# Patient Record
Sex: FEMALE | Race: BLACK OR AFRICAN AMERICAN | NOT HISPANIC OR LATINO
[De-identification: names, ages, dates, MRNs, and addresses within clinical notes are randomized per-mention and may not be internally consistent; named-entity substitution may affect disease eponyms.]

---

## 2017-01-26 PROBLEM — Z00.00 ENCOUNTER FOR PREVENTIVE HEALTH EXAMINATION: Status: ACTIVE | Noted: 2017-01-26

## 2017-03-06 ENCOUNTER — APPOINTMENT (OUTPATIENT)
Dept: NEUROLOGY | Facility: CLINIC | Age: 67
End: 2017-03-06

## 2017-05-23 ENCOUNTER — APPOINTMENT (OUTPATIENT)
Dept: NEUROLOGY | Facility: CLINIC | Age: 67
End: 2017-05-23

## 2017-05-23 VITALS
DIASTOLIC BLOOD PRESSURE: 78 MMHG | SYSTOLIC BLOOD PRESSURE: 130 MMHG | HEIGHT: 60 IN | WEIGHT: 100 LBS | BODY MASS INDEX: 19.63 KG/M2

## 2017-05-23 DIAGNOSIS — G47.00 INSOMNIA, UNSPECIFIED: ICD-10-CM

## 2017-05-23 DIAGNOSIS — Z78.9 OTHER SPECIFIED HEALTH STATUS: ICD-10-CM

## 2017-05-23 RX ORDER — MULTIVITAMIN
TABLET ORAL DAILY
Refills: 0 | Status: ACTIVE | COMMUNITY
Start: 2017-05-23

## 2017-08-23 ENCOUNTER — RX RENEWAL (OUTPATIENT)
Age: 67
End: 2017-08-23

## 2017-09-03 ENCOUNTER — RX RENEWAL (OUTPATIENT)
Age: 67
End: 2017-09-03

## 2017-09-12 ENCOUNTER — APPOINTMENT (OUTPATIENT)
Dept: NEUROLOGY | Facility: CLINIC | Age: 67
End: 2017-09-12
Payer: MEDICAID

## 2017-09-12 VITALS
WEIGHT: 100 LBS | SYSTOLIC BLOOD PRESSURE: 115 MMHG | BODY MASS INDEX: 19.63 KG/M2 | HEART RATE: 56 BPM | HEIGHT: 60 IN | DIASTOLIC BLOOD PRESSURE: 74 MMHG

## 2017-09-12 PROCEDURE — 99214 OFFICE O/P EST MOD 30 MIN: CPT

## 2017-09-12 RX ORDER — DONEPEZIL HYDROCHLORIDE 10 MG/1
10 TABLET ORAL DAILY
Qty: 30 | Refills: 2 | Status: DISCONTINUED | COMMUNITY
Start: 2017-05-23 | End: 2017-09-12

## 2018-02-27 ENCOUNTER — APPOINTMENT (OUTPATIENT)
Dept: NEUROLOGY | Facility: CLINIC | Age: 68
End: 2018-02-27

## 2018-10-16 ENCOUNTER — APPOINTMENT (OUTPATIENT)
Dept: NEUROLOGY | Facility: CLINIC | Age: 68
End: 2018-10-16

## 2021-11-30 ENCOUNTER — INPATIENT (INPATIENT)
Facility: HOSPITAL | Age: 71
LOS: 7 days | Discharge: HOME HEALTH | End: 2021-12-08
Attending: STUDENT IN AN ORGANIZED HEALTH CARE EDUCATION/TRAINING PROGRAM | Admitting: STUDENT IN AN ORGANIZED HEALTH CARE EDUCATION/TRAINING PROGRAM
Payer: MEDICAID

## 2021-11-30 VITALS
OXYGEN SATURATION: 98 % | DIASTOLIC BLOOD PRESSURE: 65 MMHG | SYSTOLIC BLOOD PRESSURE: 124 MMHG | HEART RATE: 85 BPM | TEMPERATURE: 98 F | RESPIRATION RATE: 18 BRPM

## 2021-11-30 DIAGNOSIS — R65.10 SYSTEMIC INFLAMMATORY RESPONSE SYNDROME (SIRS) OF NON-INFECTIOUS ORIGIN WITHOUT ACUTE ORGAN DYSFUNCTION: ICD-10-CM

## 2021-11-30 DIAGNOSIS — G93.41 METABOLIC ENCEPHALOPATHY: ICD-10-CM

## 2021-11-30 DIAGNOSIS — Z29.9 ENCOUNTER FOR PROPHYLACTIC MEASURES, UNSPECIFIED: ICD-10-CM

## 2021-11-30 DIAGNOSIS — Z02.9 ENCOUNTER FOR ADMINISTRATIVE EXAMINATIONS, UNSPECIFIED: ICD-10-CM

## 2021-11-30 DIAGNOSIS — F03.90 UNSPECIFIED DEMENTIA WITHOUT BEHAVIORAL DISTURBANCE: ICD-10-CM

## 2021-11-30 DIAGNOSIS — R41.82 ALTERED MENTAL STATUS, UNSPECIFIED: ICD-10-CM

## 2021-11-30 LAB
ALBUMIN SERPL ELPH-MCNC: 3.8 G/DL — SIGNIFICANT CHANGE UP (ref 3.3–5)
ALP SERPL-CCNC: 53 U/L — SIGNIFICANT CHANGE UP (ref 40–120)
ALT FLD-CCNC: 46 U/L — HIGH (ref 4–33)
ANION GAP SERPL CALC-SCNC: 12 MMOL/L — SIGNIFICANT CHANGE UP (ref 7–14)
APPEARANCE UR: CLEAR — SIGNIFICANT CHANGE UP
AST SERPL-CCNC: 32 U/L — SIGNIFICANT CHANGE UP (ref 4–32)
BACTERIA # UR AUTO: ABNORMAL
BASOPHILS # BLD AUTO: 0.01 K/UL — SIGNIFICANT CHANGE UP (ref 0–0.2)
BASOPHILS NFR BLD AUTO: 0.2 % — SIGNIFICANT CHANGE UP (ref 0–2)
BILIRUB SERPL-MCNC: 1.2 MG/DL — SIGNIFICANT CHANGE UP (ref 0.2–1.2)
BILIRUB UR-MCNC: ABNORMAL
BUN SERPL-MCNC: 15 MG/DL — SIGNIFICANT CHANGE UP (ref 7–23)
CALCIUM SERPL-MCNC: 9.2 MG/DL — SIGNIFICANT CHANGE UP (ref 8.4–10.5)
CHLORIDE SERPL-SCNC: 103 MMOL/L — SIGNIFICANT CHANGE UP (ref 98–107)
CO2 SERPL-SCNC: 25 MMOL/L — SIGNIFICANT CHANGE UP (ref 22–31)
COLOR SPEC: ABNORMAL
CREAT SERPL-MCNC: 0.98 MG/DL — SIGNIFICANT CHANGE UP (ref 0.5–1.3)
DIFF PNL FLD: NEGATIVE — SIGNIFICANT CHANGE UP
EOSINOPHIL # BLD AUTO: 0.02 K/UL — SIGNIFICANT CHANGE UP (ref 0–0.5)
EOSINOPHIL NFR BLD AUTO: 0.4 % — SIGNIFICANT CHANGE UP (ref 0–6)
EPI CELLS # UR: 1 /HPF — SIGNIFICANT CHANGE UP (ref 0–5)
GLUCOSE SERPL-MCNC: 109 MG/DL — HIGH (ref 70–99)
GLUCOSE UR QL: NEGATIVE — SIGNIFICANT CHANGE UP
HCT VFR BLD CALC: 36.9 % — SIGNIFICANT CHANGE UP (ref 34.5–45)
HGB BLD-MCNC: 12.3 G/DL — SIGNIFICANT CHANGE UP (ref 11.5–15.5)
HYALINE CASTS # UR AUTO: 3 /LPF — SIGNIFICANT CHANGE UP (ref 0–7)
IANC: 3.01 K/UL — SIGNIFICANT CHANGE UP (ref 1.5–8.5)
IMM GRANULOCYTES NFR BLD AUTO: 0.8 % — SIGNIFICANT CHANGE UP (ref 0–1.5)
KETONES UR-MCNC: NEGATIVE — SIGNIFICANT CHANGE UP
LACTATE BLDV-MCNC: 1.4 MMOL/L — SIGNIFICANT CHANGE UP (ref 0.5–2)
LEUKOCYTE ESTERASE UR-ACNC: NEGATIVE — SIGNIFICANT CHANGE UP
LYMPHOCYTES # BLD AUTO: 1.12 K/UL — SIGNIFICANT CHANGE UP (ref 1–3.3)
LYMPHOCYTES # BLD AUTO: 23.8 % — SIGNIFICANT CHANGE UP (ref 13–44)
MAGNESIUM SERPL-MCNC: 2.3 MG/DL — SIGNIFICANT CHANGE UP (ref 1.6–2.6)
MCHC RBC-ENTMCNC: 30.8 PG — SIGNIFICANT CHANGE UP (ref 27–34)
MCHC RBC-ENTMCNC: 33.3 GM/DL — SIGNIFICANT CHANGE UP (ref 32–36)
MCV RBC AUTO: 92.5 FL — SIGNIFICANT CHANGE UP (ref 80–100)
MONOCYTES # BLD AUTO: 0.51 K/UL — SIGNIFICANT CHANGE UP (ref 0–0.9)
MONOCYTES NFR BLD AUTO: 10.8 % — SIGNIFICANT CHANGE UP (ref 2–14)
NEUTROPHILS # BLD AUTO: 3.01 K/UL — SIGNIFICANT CHANGE UP (ref 1.8–7.4)
NEUTROPHILS NFR BLD AUTO: 64 % — SIGNIFICANT CHANGE UP (ref 43–77)
NITRITE UR-MCNC: NEGATIVE — SIGNIFICANT CHANGE UP
NRBC # BLD: 0 /100 WBCS — SIGNIFICANT CHANGE UP
NRBC # FLD: 0 K/UL — SIGNIFICANT CHANGE UP
PH UR: 6 — SIGNIFICANT CHANGE UP (ref 5–8)
PLATELET # BLD AUTO: 305 K/UL — SIGNIFICANT CHANGE UP (ref 150–400)
POTASSIUM SERPL-MCNC: 3.7 MMOL/L — SIGNIFICANT CHANGE UP (ref 3.5–5.3)
POTASSIUM SERPL-SCNC: 3.7 MMOL/L — SIGNIFICANT CHANGE UP (ref 3.5–5.3)
PROT SERPL-MCNC: 7.7 G/DL — SIGNIFICANT CHANGE UP (ref 6–8.3)
PROT UR-MCNC: ABNORMAL
RBC # BLD: 3.99 M/UL — SIGNIFICANT CHANGE UP (ref 3.8–5.2)
RBC # FLD: 12.6 % — SIGNIFICANT CHANGE UP (ref 10.3–14.5)
RBC CASTS # UR COMP ASSIST: 2 /HPF — SIGNIFICANT CHANGE UP (ref 0–4)
SARS-COV-2 RNA SPEC QL NAA+PROBE: DETECTED
SODIUM SERPL-SCNC: 140 MMOL/L — SIGNIFICANT CHANGE UP (ref 135–145)
SP GR SPEC: 1.03 — SIGNIFICANT CHANGE UP (ref 1–1.05)
UROBILINOGEN FLD QL: ABNORMAL
WBC # BLD: 4.71 K/UL — SIGNIFICANT CHANGE UP (ref 3.8–10.5)
WBC # FLD AUTO: 4.71 K/UL — SIGNIFICANT CHANGE UP (ref 3.8–10.5)
WBC UR QL: 2 /HPF — SIGNIFICANT CHANGE UP (ref 0–5)

## 2021-11-30 PROCEDURE — 99223 1ST HOSP IP/OBS HIGH 75: CPT

## 2021-11-30 PROCEDURE — 99285 EMERGENCY DEPT VISIT HI MDM: CPT

## 2021-11-30 PROCEDURE — 71045 X-RAY EXAM CHEST 1 VIEW: CPT | Mod: 26

## 2021-11-30 RX ORDER — ACETAMINOPHEN 500 MG
650 TABLET ORAL ONCE
Refills: 0 | Status: COMPLETED | OUTPATIENT
Start: 2021-11-30 | End: 2021-11-30

## 2021-11-30 RX ORDER — AZITHROMYCIN 500 MG/1
500 TABLET, FILM COATED ORAL ONCE
Refills: 0 | Status: COMPLETED | OUTPATIENT
Start: 2021-11-30 | End: 2021-11-30

## 2021-11-30 RX ORDER — CEFTRIAXONE 500 MG/1
1000 INJECTION, POWDER, FOR SOLUTION INTRAMUSCULAR; INTRAVENOUS ONCE
Refills: 0 | Status: COMPLETED | OUTPATIENT
Start: 2021-11-30 | End: 2021-11-30

## 2021-11-30 RX ORDER — SODIUM CHLORIDE 9 MG/ML
1000 INJECTION INTRAMUSCULAR; INTRAVENOUS; SUBCUTANEOUS ONCE
Refills: 0 | Status: COMPLETED | OUTPATIENT
Start: 2021-11-30 | End: 2021-11-30

## 2021-11-30 RX ADMIN — SODIUM CHLORIDE 1000 MILLILITER(S): 9 INJECTION INTRAMUSCULAR; INTRAVENOUS; SUBCUTANEOUS at 20:01

## 2021-11-30 RX ADMIN — Medication 650 MILLIGRAM(S): at 20:02

## 2021-11-30 RX ADMIN — Medication 650 MILLIGRAM(S): at 22:00

## 2021-11-30 RX ADMIN — CEFTRIAXONE 100 MILLIGRAM(S): 500 INJECTION, POWDER, FOR SOLUTION INTRAMUSCULAR; INTRAVENOUS at 21:04

## 2021-11-30 RX ADMIN — Medication 650 MILLIGRAM(S): at 21:04

## 2021-11-30 RX ADMIN — AZITHROMYCIN 255 MILLIGRAM(S): 500 TABLET, FILM COATED ORAL at 23:10

## 2021-11-30 NOTE — ED PROVIDER NOTE - ATTENDING CONTRIBUTION TO CARE
I have personally seen and examined this patient.  I have fully participated in the care of this patient. I have reviewed all pertinent clinical information, including history, physical exam, plan and the ACP’s note and agree except as noted. - MD Dionicio.    70 yo F, with dementia, since 58 yo, after lost child, pt lives with her daughter, more agitation, forgetfullness over the last 2wk, now stopped PO x5d, febrile rectally, likely from infection, ua, xr, given ams, likely admission for iv abx.

## 2021-11-30 NOTE — H&P ADULT - ATTENDING COMMENTS
This is a 70 y/o F with pmhx of dementia presented to the ED for new onset weakness. The patient and family member denies shortness of breath and coughing. Daughter states she is completely healthy. The patient herself denies symptoms. However, the daughter complains that lately she has been more weak and not herself, more confused and speaking less. The patient does have baseline dementia, but this is not her baseline. Both daughter and patient are not vaccinated for covid because the daughter does not believe in it.    Physical exam shows an elderly female, NAD, on room air with good SpO2, in fetal position however would allow examination, lungs CTA b/l however poor inspiratory effort, cardiac s1s2 RRR no murmurs, no LE edema b/l.    Labs significant for elevated ferritin level, elevated LDH, elevated CRP, and COVID pos on RVP. CXR significant for R sided opacities.    The patient is admitted for covid pneumonia with possibly super imposed bacterial pneumonia. There is concern for bacterial pneumonia because on the CXR it only shows 1 sided opacities which are atypical of covid pneumonia presentation. Will start empiric abx and obtain blood cultures, sputum cultures. Will obtain CT chest to further characterize opacities. Will monitor respiratory status and provide supplemental oxygen as needed. Will hold off giving remdesivir and dexamethasone for now since she is currently on room air however  will start if she becomes oxygen dependent. I thoroughly explained to the daughter that it is important for her and the patient to get the vaccine especially due to the recent, new omicron variant. I also explained to her that there is a chance that she may develop acute respiratory failure from covid19 and may need intubation if she does get worse throughout her admission. Daughter expressed understanding (Lashaun, [phone]).

## 2021-11-30 NOTE — H&P ADULT - PROBLEM SELECTOR PLAN 8
1. Name of PCP: Michela East [phone]  2. PCP Contacted on Admission [ ] Y [x] N [ ] N/A  3. PCP Contacted on Discharge [ ] Y [ ] N [ ] N/A  4. Post discharge appointment Date and Location:  5. Summary of Handoff Given to PCP [ ] Y [ ] N

## 2021-11-30 NOTE — ED PROVIDER NOTE - WR ORDER DATE AND TIME 1
"Responded to unit referral. Pt . Family by bedside. Provided pastoral care and grief packet to family. Family very tearful as pt's "mom just  two days ago". Family experiencing multiple losses.  Family appreciative of visit. Provided decedent packet to unit staff.   " 30-Nov-2021 19:24

## 2021-11-30 NOTE — H&P ADULT - NSHPREVIEWOFSYSTEMS_GEN_ALL_CORE
REVIEW OF SYSTEMS:  CONSTITUTIONAL: No fever or chills  EYES: No visual disturbances or eye pain  ENMT: No sinus or throat pain  RESPIRATORY: No shortness of breath or cough  CARDIOVASCULAR: No chest pain, palpitations, or dizziness  GASTROINTESTINAL: No abdominal or epigastric pain. No diarrhea. No melena or hematochezia. +constipated x 2 wk, unclear flatus  GENITOURINARY: No dysuria or hematuria +decreased UOP  NEUROLOGICAL: No headaches, loss of strength, numbness, or tremors  MUSCULOSKELETAL: No joint pain or swelling; No muscle, back, or extremity pain

## 2021-11-30 NOTE — ED ADULT TRIAGE NOTE - CHIEF COMPLAINT QUOTE
p/t with hx dementia brought in by daughter for increased AMS, not eating for few days, baseline confused, fs 79mdl, cup of apple juice given

## 2021-11-30 NOTE — ED PROVIDER NOTE - PHYSICAL EXAMINATION
Vital signs reviewed.   CONSTITUTIONAL: Well-appearing; well-nourished; in no apparent distress. Non-toxic appearing.   HEAD: Normocephalic, atraumatic.  EYES: Normal conjunctiva and no sclera injection noted  ENT: normal nose; no rhinorrhea  CARD: Normal S1, S2  RESP: Normal chest excursion with respiration; breath sounds clear and equal bilaterally  ABD/GI: soft, non-distended; non-tender; no CVA tenderness  EXT/MS: moves all extremities; distal pulses are normal, no pedal edema.  SKIN: Normal for age and race; warm; dry; good turgor; no apparent lesions or exudate noted.  NEURO: Awake, alert, oriented to self, no gross deficits, CN II-XII grossly intact, no motor or sensory deficit noted.  PSYCH: Normal mood; appropriate affect.

## 2021-11-30 NOTE — H&P ADULT - HISTORY OF PRESENT ILLNESS
71F PMH dementia (ambulatory, largely independent at bl) p/w AMS. Per daughter (Lashaun) pt is "not herself", less conversational, requiring relatively more attention, ambulating slowly, more hunched, requiring assistance to bath and feed. W/ anorexia, decreased UOP, no recent BM. Not vaccinated.     ED Course  VS: febrile Tmax 38.4C, tachycardic HR 80s-100s, hypertensive BP 110s-140s/60s-70s, tachypneic RR 18-24, SpO2%  RA  Labs: WBC wnl 4.7; ALT elevated 46; lactate wnl 1.4; pending trops  Micro: UA mod bacteria, negative nitrite, LE, 2 WBCs; BCx/UCx/RVP w/ COVID-19 pending  Imaging: CXR patchy R lung opacities likely infxs  Received: CTX 1g IV, azithro 500mg IV; NS 1L IV; tyl 650mg rectal and po    Admit to medicine for further eval/mgt 71F PMH dementia (ambulatory, largely independent at bl) p/w AMS. Per daughter (Lashaun) pt is "not herself", less conversational, requiring relatively more attention, ambulating slowly, more hunched, requiring assistance to bath and feed. W/ anorexia, decreased UOP, no recent BM. Not vaccinated.     ED Course  VS: febrile Tmax 38.4C, tachycardic HR 80s-100s, hypertensive BP 110s-140s/60s-70s, tachypneic RR 18-24, SpO2%  RA  Labs: WBC wnl 4.7; ALT elevated 46; lactate wnl 1.4; pending trops  Micro: +RVP SARS COV2; UA mod bacteria, negative nitrite, LE, 2 WBCs; BCx/UCx pending  Imaging: CXR patchy R lung opacities likely infxs  Received: CTX 1g IV, azithro 500mg IV; NS 1L IV; tyl 650mg rectal and po    Admit to medicine for further eval/mgt 71F PMH dementia (ambulatory at bl, largely independent), BIB daughter iso AMS. Per daughter (Lashaun) pt is "not herself", transitioned from acting like a 12yr-old at bl to now acting like a 4yr-old, including being less conversational,  requiring relatively more attention, ambulating slowly, more hunched, requiring assistance to bath and feed. Additionally reports anorexia, decreased UOP, w/o recent BM x 2 wks, unclear if passing flatus, however denies abd pain. Reports b/l UE pain when lifting arms x wks, uses OTC Tylenol for relief, and otherwise denies sx.     Of not pt and daughter are both not vaccinated.    ED Course  VS: febrile Tmax 38.4C, tachycardic HR 80s-100s, hypertensive BP 110s-140s/60s-70s, tachypneic RR 18-24, SpO2%  RA  Labs: WBC wnl 4.7; ALT elevated 46; lactate wnl 1.4; pending trops  Micro: +RVP SARS COV2; UA mod bacteria, negative nitrite, LE, 2 WBCs; BCx/UCx pending  Imaging: CXR patchy R lung opacities likely infxs  Received: CTX 1g IV, azithro 500mg IV; NS 1L IV; tyl 650mg rectal and po    Admit to medicine for further eval/mgt

## 2021-11-30 NOTE — H&P ADULT - PROBLEM SELECTOR PLAN 2
superimposed on bl dementia, decreased conversation, requiring further assistance w/ ambulation, bathing, feeding  likely iso acute infxn  - CTH NC r/o primary neuro etiology  - q4h neurocheck  - monitor for improvement on abx RVP+ SARSCOV2 iso non-vaccinated status  meeting SIRS as above, however SpO2% adequate on RA  - consider remdesivir, dexamethasone if beginning to require supplemental O2  - bl CBC w/ diff, CMP, trop obtained; will obtain LDH, procal, CPK, CRP, ferritin, D-Dimer, coags, A1c  - rpt D-Dimer 48-72h  - isolation precautions  - VTE ppx RVP+ SARSCOV2 iso non-vaccinated status  meeting SIRS as above, however SpO2% adequate on RA  - consider remdesivir, dexamethasone if beginning to require supplemental O2  - bl CBC w/ diff, CMP, trop obtained; will obtain LDH, procal, CPK, CRP, ferritin, D-Dimer, coags, A1c  - rpt D-Dimer 48-72h  - isolation precautions  - VTE ppx  - CT Chest NC as above RVP+ SARSCOV2 iso non-vaccinated status  meeting SIRS as above, however SpO2% adequate on RA  - consider remdesivir, dexamethasone if beginning to require supplemental O2  - bl CBC w/ diff, CMP, trop obtained; will obtain LDH, procal, CPK, CRP, ferritin, D-Dimer, coags, A1c  - rpt D-Dimer 48-72h  - isolation precautions  - VTE holding pending CTH r/o bleed, if w/o concerning findings can start lovenox 40mg QD pending D-Dimer (can adjust lovenox dose once obtained)  - CT Chest NC as above  - monitor SpO2%, supplemental O2 prn

## 2021-11-30 NOTE — ED ADULT NURSE NOTE - NSIMPLEMENTINTERV_GEN_ALL_ED
Implemented All Fall Risk Interventions:  [place] to call system. Call bell, personal items and telephone within reach. Instruct patient to call for assistance. Room bathroom lighting operational. Non-slip footwear when patient is off stretcher. Physically safe environment: no spills, clutter or unnecessary equipment. Stretcher in lowest position, wheels locked, appropriate side rails in place. Provide visual cue, wrist band, yellow gown, etc. Monitor gait and stability. Monitor for mental status changes and reorient to person, place, and time. Review medications for side effects contributing to fall risk. Reinforce activity limits and safety measures with patient and family.

## 2021-11-30 NOTE — ED ADULT NURSE NOTE - OBJECTIVE STATEMENT
70 y/o female, a&ox2, received to rm 16 for altered mental status. Family at bedside reports "pt has not eaten for the past few days and has not been acting like herself." Family reports pmh of dementia. Pt denies CP, SOB, and pain at this time. Pt presents agitated. Respirations are even and unlabored, no signs of respiratory distress. Sinus tachycardia on cardiac monitor.

## 2021-11-30 NOTE — ED PROVIDER NOTE - OBJECTIVE STATEMENT
72 Y/O F w/ PMH of dementia presents with daughter Lashaun for AMS. As per daughter, pt has not been herself the past week. Normally can ambulate without assistance, can perform most ADL's by herself or with assistance but has required more attention and care. Today was noted to be walking slowly, hunched over and needed assistance. Would not scrub herself during shower and needed to be fed by daughter. Has decreased appetite, decreased urinary output and no recent bowel movement. Pt has become less conversational. No hx of CAD, MI not on anticoagulant. 72 Y/O F w/ PMH of dementia presents with daughter Lashaun for AMS. As per daughter, pt has not been herself the past week. Normally can ambulate without assistance, can perform most ADL's by herself or with assistance but has required more attention and care. Today was noted to be walking slowly, hunched over and needed assistance. Would not scrub herself during shower and needed to be fed by daughter. Has decreased appetite, decreased urinary output and no recent bowel movement. Pt has become less conversational. No hx of CAD, MI not on anticoagulant. Not vaccinated

## 2021-11-30 NOTE — H&P ADULT - NSHPPHYSICALEXAM_GEN_ALL_CORE
PHYSICAL EXAM:  GENERAL: Elderly appearing woman, laying in bed in fetal position, constantly moving around, appearing agitated but somewhat redirectable  HEAD: Atraumatic, Normocephalic  EYES: Pupils equal, round; conjunctiva pink and sclera anicteric  ENMT: Moist mucous membranes  NECK: Supple  NERVOUS SYSTEM: A&Ox0-1 (responding to nickname Ofelia), Moving extremities  CHEST/LUNG: Clear to auscultation bilaterally; No rales, rhonchi, wheezing, or rubs +poor inspiratory effort, limited by participation  HEART: Tachycardic w/ regular rhythm; No murmurs, rubs, or gallops  ABDOMEN: Soft, Nontender, Nondistended; Bowel sounds present  EXTREMITIES: 2+ Peripheral Pulses, No edema

## 2021-11-30 NOTE — H&P ADULT - PROBLEM SELECTOR PLAN 5
1. Name of PCP:  2. PCP Contacted on Admission [ ] Y [x] N [ ] N/A  3. PCP Contacted on Discharge [ ] Y [ ] N [ ] N/A  4. Post discharge appointment Date and Location:  5. Summary of Handoff Given to PCP [ ] Y [ ] N 1. Name of PCP: Michela East [phone]  2. PCP Contacted on Admission [ ] Y [x] N [ ] N/A  3. PCP Contacted on Discharge [ ] Y [ ] N [ ] N/A  4. Post discharge appointment Date and Location:  5. Summary of Handoff Given to PCP [ ] Y [ ] N - consider chemical DVT ppx after CTH NC r/o bleed  - regular diet pending formal swallow eval  - fall precaution  - aspiration precaution Per daughter iso decreased po intake  SCr 0.98 unclear bl  - monitor Is/Os  - BS q8h Per daughter iso decreased po intake  SCr 0.98 unclear bl  - UA w/ mod bacteria, f/u UCx  - monitor Is/Os  - BS q8h Per daughter iso decreased po intake  SCr 0.98 unclear bl  - UA w/ mod bacteria, f/u UCx and on abx as above  - monitor Is/Os  - BS q8h

## 2021-11-30 NOTE — H&P ADULT - NSHPLABSRESULTS_GEN_ALL_CORE
LABS:                        12.3   4.71  )-----------( 305      ( 2021 20:17 )             36.9         140  |  103  |  15  ----------------------------<  109<H>  3.7   |  25  |  0.98    Ca    9.2      2021 20:17  Mg     2.30         TPro  7.7  /  Alb  3.8  /  TBili  1.2  /  DBili  x   /  AST  32  /  ALT  46<H>  /  AlkPhos  53        Urinalysis Basic - ( 2021 21:33 )    Color: Blanca / Appearance: Clear / S.028 / pH: x  Gluc: x / Ketone: Negative  / Bili: Small / Urobili: 12 mg/dL   Blood: x / Protein: 30 mg/dL / Nitrite: Negative   Leuk Esterase: Negative / RBC: 2 /HPF / WBC 2 /HPF   Sq Epi: x / Non Sq Epi: 1 /HPF / Bacteria: Moderate          RADIOLOGY & ADDITIONAL TESTS:    < from: Xray Chest 1 View AP/PA (21 @ 22:28) >      ******PRELIMINARY REPORT******    ******PRELIMINARY REPORT******            EXAM:  XR CHEST AP OR PA 1V        PROCEDURE DATE:  2021     ******PRELIMINARY REPORT******    ******PRELIMINARY REPORT******            INTERPRETATION:  CLINICAL INFORMATION: Sepsis.    TECHNIQUE: Frontal radiograph of the chest.    COMPARISON: No comparison available.    FINDINGS:  Patchy right lung opacities.  The left lung is clear. No pleural effusion or pneumothorax.  The heart is normal in size.  The visualized osseous structures are unremarkable.    IMPRESSION:  Patchy right lung opacities, likely infectious.      ******PRELIMINARY REPORT******    ******PRELIMINARY REPORT******          KRYSTA TRISTAN MD; Resident Radiology    < end of copied text >

## 2021-11-30 NOTE — H&P ADULT - ASSESSMENT
71F PMH dementia (ambulatory, largely independent at bl) p/w AMS, found to meet SIRS criteria (febrile, tachycardic, tachypneic) 2/2 suspected pulmonary source on CXR 71F PMH dementia (ambulatory, largely independent at bl) p/w AMS, found to meet SIRS criteria (febrile, tachycardic, tachypneic) 2/2 suspected pulmonary source on CXR and +RVP SARSCOV2 71F PMH dementia (ambulatory, largely independent at bl) p/w AMS, found to meet SIRS criteria (febrile, tachycardic, tachypneic) 2/2 suspected pulmonary source on CXR iso +RVP SARSCOV2

## 2021-11-30 NOTE — ED ADULT NURSE NOTE - NS ED NURSE REPORT GIVEN DT
Chris Fee Heard: Khadar PARMAR Case ID: [id number] - Rx #: [id number]    Submitted PA thru covermymeds. Awaiting decision.
From [employer], Ria [facility]           Patient's daughter forgot to ask for Lidocaine Patch to be sent to [facility], they would like to know if there are any patches that insurance will cover, they had to pay out of pocket.
Garrett Aly does not have any appts this week. Scheduled vv with Risser tomorrow.
I dont think I have any open appointments in office this week do I?
Medication refilled, please advise that we can request an insurance authorization.
Patient's daughter wants to bring her mother in the office for a hospital follow she fell and broke her ribs. The first available in office with Dr. Demario Blair is on August 13 but they wanted something this week in office. I told her I would have to send a message to [facility] because she is my only provider in office this week.        [phone]
01-Dec-2021 00:08

## 2021-11-30 NOTE — H&P ADULT - PROBLEM SELECTOR PLAN 1
meeting 3 SIRS criteria (febrile, tachycardic, tachypneic)  CXR patchy R lung opacities likely infxs  lactate wnl  s/p CTX, azithro, 1L NS in ED  - c/w mIVF  - VS q4h  - tyl 650mg po q6h prn fever  - monitor temp and WBC curve meeting 3 SIRS criteria (febrile, tachycardic, tachypneic)  CXR patchy R lung opacities likely infxs iso RVP+ SARSCOV2  lactate wnl  s/p CTX, azithro, 1L NS in ED  - c/w mIVF  - VS q4h  - tyl 650mg po q6h prn fever  - monitor temp and WBC curve meeting 3 SIRS criteria (febrile, tachycardic, tachypneic)  CXR patchy R lung opacities likely infxs iso RVP+ SARSCOV2  lactate wnl  s/p CTX, azithro, 1L NS in ED  - hold off abx  - c/w mIVF  - VS q4h  - tyl 650mg po q6h prn fever  - monitor temp and WBC curve meeting 3 SIRS criteria (febrile, tachycardic, tachypneic)  CXR patchy R lung opacities likely infxs iso RVP+ SARSCOV2  lactate wnl  s/p CTX, azithro, 1L NS in ED  - hold off abx  - obtain CT Chest NC  - c/w mIVF  - VS q4h  - tyl 650mg po q6h prn fever  - monitor temp and WBC curve meeting 3 SIRS criteria (febrile, tachycardic, tachypneic)  iso RVP+ SARSCOV2 however CXR patchy R lung opacities less typical, possibly iso bacterial PNA  lactate wnl  s/p CTX, azithro, 1L NS in ED  - c/w CTX, azithro  - obtain ulegionella  - obtain CT Chest NC for further characterization  - BPs wnl, hold mIVF  - VS q4h  - tyl 650mg po q6h prn fever  - monitor temp and WBC curve meeting 3 SIRS criteria (febrile, tachycardic, tachypneic)  iso RVP+ SARSCOV2 however CXR patchy R lung opacities less typical, possibly iso bacterial PNA  lactate wnl  s/p CTX, azithro, 1L NS in ED  - c/w CTX, azithro  - obtain ulegionella, ustrep  - obtain CT Chest NC for further characterization  - BPs wnl, hold mIVF  - VS q4h  - tyl 650mg po q6h prn fever  - monitor temp and WBC curve

## 2021-11-30 NOTE — H&P ADULT - PROBLEM SELECTOR PLAN 4
- consider chemical DVT ppx after CTH NC r/o bleed  - regular diet pending formal swallow eval  - fall precaution  - aspiration precaution Ambulatory, largely independent w/ ADLs at bl  Obtain outpt collateral

## 2021-11-30 NOTE — H&P ADULT - PROBLEM SELECTOR PLAN 6
1. Name of PCP: Michela East [phone]  2. PCP Contacted on Admission [ ] Y [x] N [ ] N/A  3. PCP Contacted on Discharge [ ] Y [ ] N [ ] N/A  4. Post discharge appointment Date and Location:  5. Summary of Handoff Given to PCP [ ] Y [ ] N W/o BM x 2 wks per daughter and unclear flatus  Abd exam w/o abn  - monitor for BMs  - start senna, miralax, titrate per BM

## 2021-11-30 NOTE — H&P ADULT - NSHPSOCIALHISTORY_GEN_ALL_CORE
Denies tobacco, EtOH, or illicit drug use. Largely independent in ADLs, ambulatory at bl. Lives w/ daughter. Immigrated from [place] x 20-30 yrs ago.

## 2021-11-30 NOTE — ED ADULT NURSE REASSESSMENT NOTE - NS ED NURSE REASSESS COMMENT FT1
Pt awake and alert at this time, resting on stretcher. Respirations even and unlabored, speaking in full sentences without any difficulty, no accessory muscle use, normal sinus rhythm on cardiac monitor. Straight cath performed as per order, sterile technique utilized. Pt tolerated well. Pt not exhibiting any non-verbal indicators of pain at this time. Pt well appearing and in no acute distress. Pt awaiting chest x-ray.

## 2021-11-30 NOTE — H&P ADULT - PROBLEM SELECTOR PLAN 3
Ambulatory, largely independent w/ ADLs at bl  Obtain outpt collateral superimposed on bl dementia, decreased conversation, requiring further assistance w/ ambulation, bathing, feeding  likely iso acute infxn  - CTH NC r/o primary neuro etiology  - q4h neurocheck  - monitor for improvement on abx

## 2021-12-01 DIAGNOSIS — U07.1 COVID-19: ICD-10-CM

## 2021-12-01 DIAGNOSIS — R34 ANURIA AND OLIGURIA: ICD-10-CM

## 2021-12-01 DIAGNOSIS — J18.9 PNEUMONIA, UNSPECIFIED ORGANISM: ICD-10-CM

## 2021-12-01 DIAGNOSIS — K59.00 CONSTIPATION, UNSPECIFIED: ICD-10-CM

## 2021-12-01 LAB
B PERT DNA SPEC QL NAA+PROBE: SIGNIFICANT CHANGE UP
B PERT+PARAPERT DNA PNL SPEC NAA+PROBE: SIGNIFICANT CHANGE UP
BORDETELLA PARAPERTUSSIS (RAPRVP): SIGNIFICANT CHANGE UP
C PNEUM DNA SPEC QL NAA+PROBE: SIGNIFICANT CHANGE UP
CRP SERPL-MCNC: 81.6 MG/L — HIGH
CULTURE RESULTS: NO GROWTH — SIGNIFICANT CHANGE UP
D DIMER BLD IA.RAPID-MCNC: 646 NG/ML DDU — HIGH
ERYTHROCYTE [SEDIMENTATION RATE] IN BLOOD: 82 MM/HR — HIGH (ref 4–25)
FERRITIN SERPL-MCNC: 1224 NG/ML — HIGH (ref 15–150)
FLUAV SUBTYP SPEC NAA+PROBE: SIGNIFICANT CHANGE UP
FLUBV RNA SPEC QL NAA+PROBE: SIGNIFICANT CHANGE UP
HADV DNA SPEC QL NAA+PROBE: SIGNIFICANT CHANGE UP
HCOV 229E RNA SPEC QL NAA+PROBE: SIGNIFICANT CHANGE UP
HCOV HKU1 RNA SPEC QL NAA+PROBE: SIGNIFICANT CHANGE UP
HCOV NL63 RNA SPEC QL NAA+PROBE: SIGNIFICANT CHANGE UP
HCOV OC43 RNA SPEC QL NAA+PROBE: SIGNIFICANT CHANGE UP
HMPV RNA SPEC QL NAA+PROBE: SIGNIFICANT CHANGE UP
HPIV1 RNA SPEC QL NAA+PROBE: SIGNIFICANT CHANGE UP
HPIV2 RNA SPEC QL NAA+PROBE: SIGNIFICANT CHANGE UP
HPIV3 RNA SPEC QL NAA+PROBE: SIGNIFICANT CHANGE UP
HPIV4 RNA SPEC QL NAA+PROBE: SIGNIFICANT CHANGE UP
INR BLD: 1.05 RATIO — SIGNIFICANT CHANGE UP (ref 0.88–1.16)
LDH SERPL L TO P-CCNC: 333 U/L — HIGH (ref 135–225)
M PNEUMO DNA SPEC QL NAA+PROBE: SIGNIFICANT CHANGE UP
PROCALCITONIN SERPL-MCNC: 0.11 NG/ML — HIGH (ref 0.02–0.1)
PROTHROM AB SERPL-ACNC: 12.1 SEC — SIGNIFICANT CHANGE UP (ref 10.6–13.6)
RAPID RVP RESULT: DETECTED
RSV RNA SPEC QL NAA+PROBE: SIGNIFICANT CHANGE UP
RV+EV RNA SPEC QL NAA+PROBE: SIGNIFICANT CHANGE UP
SPECIMEN SOURCE: SIGNIFICANT CHANGE UP

## 2021-12-01 PROCEDURE — 71250 CT THORAX DX C-: CPT | Mod: 26

## 2021-12-01 PROCEDURE — 99233 SBSQ HOSP IP/OBS HIGH 50: CPT

## 2021-12-01 PROCEDURE — 70450 CT HEAD/BRAIN W/O DYE: CPT | Mod: 26

## 2021-12-01 RX ORDER — ACETAMINOPHEN 500 MG
650 TABLET ORAL EVERY 6 HOURS
Refills: 0 | Status: DISCONTINUED | OUTPATIENT
Start: 2021-12-01 | End: 2021-12-08

## 2021-12-01 RX ORDER — THIAMINE MONONITRATE (VIT B1) 100 MG
100 TABLET ORAL DAILY
Refills: 0 | Status: DISCONTINUED | OUTPATIENT
Start: 2021-12-01 | End: 2021-12-08

## 2021-12-01 RX ORDER — POLYETHYLENE GLYCOL 3350 17 G/17G
17 POWDER, FOR SOLUTION ORAL DAILY
Refills: 0 | Status: DISCONTINUED | OUTPATIENT
Start: 2021-12-01 | End: 2021-12-08

## 2021-12-01 RX ORDER — SODIUM CHLORIDE 9 MG/ML
1000 INJECTION INTRAMUSCULAR; INTRAVENOUS; SUBCUTANEOUS
Refills: 0 | Status: DISCONTINUED | OUTPATIENT
Start: 2021-12-01 | End: 2021-12-01

## 2021-12-01 RX ORDER — REMDESIVIR 5 MG/ML
100 INJECTION INTRAVENOUS EVERY 24 HOURS
Refills: 0 | Status: COMPLETED | OUTPATIENT
Start: 2021-12-02 | End: 2021-12-05

## 2021-12-01 RX ORDER — ENOXAPARIN SODIUM 100 MG/ML
40 INJECTION SUBCUTANEOUS DAILY
Refills: 0 | Status: DISCONTINUED | OUTPATIENT
Start: 2021-12-01 | End: 2021-12-01

## 2021-12-01 RX ORDER — AZITHROMYCIN 500 MG/1
500 TABLET, FILM COATED ORAL EVERY 24 HOURS
Refills: 0 | Status: DISCONTINUED | OUTPATIENT
Start: 2021-12-01 | End: 2021-12-03

## 2021-12-01 RX ORDER — REMDESIVIR 5 MG/ML
INJECTION INTRAVENOUS
Refills: 0 | Status: COMPLETED | OUTPATIENT
Start: 2021-12-01 | End: 2021-12-05

## 2021-12-01 RX ORDER — ENOXAPARIN SODIUM 100 MG/ML
40 INJECTION SUBCUTANEOUS DAILY
Refills: 0 | Status: DISCONTINUED | OUTPATIENT
Start: 2021-12-01 | End: 2021-12-08

## 2021-12-01 RX ORDER — LACTOBACILLUS ACIDOPHILUS 100MM CELL
1 CAPSULE ORAL
Refills: 0 | Status: DISCONTINUED | OUTPATIENT
Start: 2021-12-01 | End: 2021-12-08

## 2021-12-01 RX ORDER — REMDESIVIR 5 MG/ML
200 INJECTION INTRAVENOUS EVERY 24 HOURS
Refills: 0 | Status: COMPLETED | OUTPATIENT
Start: 2021-12-01 | End: 2021-12-01

## 2021-12-01 RX ORDER — SENNA PLUS 8.6 MG/1
2 TABLET ORAL AT BEDTIME
Refills: 0 | Status: DISCONTINUED | OUTPATIENT
Start: 2021-12-01 | End: 2021-12-08

## 2021-12-01 RX ORDER — CEFTRIAXONE 500 MG/1
1000 INJECTION, POWDER, FOR SOLUTION INTRAMUSCULAR; INTRAVENOUS EVERY 24 HOURS
Refills: 0 | Status: COMPLETED | OUTPATIENT
Start: 2021-12-01 | End: 2021-12-05

## 2021-12-01 RX ADMIN — CEFTRIAXONE 100 MILLIGRAM(S): 500 INJECTION, POWDER, FOR SOLUTION INTRAMUSCULAR; INTRAVENOUS at 21:27

## 2021-12-01 RX ADMIN — Medication 100 MILLIGRAM(S): at 17:04

## 2021-12-01 RX ADMIN — Medication 1 TABLET(S): at 17:05

## 2021-12-01 RX ADMIN — AZITHROMYCIN 255 MILLIGRAM(S): 500 TABLET, FILM COATED ORAL at 22:42

## 2021-12-01 RX ADMIN — SENNA PLUS 2 TABLET(S): 8.6 TABLET ORAL at 22:43

## 2021-12-01 RX ADMIN — POLYETHYLENE GLYCOL 3350 17 GRAM(S): 17 POWDER, FOR SOLUTION ORAL at 10:19

## 2021-12-01 RX ADMIN — REMDESIVIR 500 MILLIGRAM(S): 5 INJECTION INTRAVENOUS at 12:53

## 2021-12-01 RX ADMIN — ENOXAPARIN SODIUM 40 MILLIGRAM(S): 100 INJECTION SUBCUTANEOUS at 10:20

## 2021-12-01 NOTE — SWALLOW BEDSIDE ASSESSMENT ADULT - SWALLOW EVAL: DIAGNOSIS
1) Moderate oral dysphagia for puree and thin liquids marked by reduced utensil stripping and reduced labial seal to cup resulting in mild anterior loss of bolus. Patient with prolonged manipulation resulting in delayed collection and oral transit/transfer. Adequate oral clearance noted. 2) Functional pharyngeal stage of swallow for puree and thin liquids marked by initiation of pharyngeal swallow trigger and hyolaryngeal elevation noted upon digital palpation. No overt s/sx of penetration/aspiration noted across all trials.

## 2021-12-01 NOTE — PATIENT PROFILE ADULT - FALL HARM RISK - HARM RISK INTERVENTIONS

## 2021-12-01 NOTE — ED ADULT NURSE REASSESSMENT NOTE - NS ED NURSE REASSESS COMMENT FT1
Pt awake and alert. Respirations even and unlabored, sating 99% on room air, no accessory muscle use. Non-verbal indicators of pain absent. Pt well appearing and in no acute distress at this time. Vital signs as per flowsheet. Pt awaiting transport to bed assignment. Bed in lowest position, safety maintained.

## 2021-12-01 NOTE — PATIENT PROFILE ADULT - NSPRONUTRITIONRISK_GEN_A_NUR
-- DO NOT REPLY / DO NOT REPLY ALL --  -- Message is from the [facility]--    Provider paged via [employer] Documentation - The below message was copied and pasted from a [employer] page:    Initiated Date/Time 12/30/2019 4:52 pm   Message Sent Date/Time 12/30/2019 4:55 pm   Source [facility] Contact [facility]   Department ACC   Phone Number [phone]   Method Secure Text   Contacted Jackie Feldman   Requested Kelsea Kessler   Details Healthcare Provider   Message   [phone] FROM: [facility] ACC RE: JASEN BOGGS PATIENTS DAUGHTER CALLED AND WOULD LIKE DR. KESSLER TO SEND A PRESCRIPTION OF SLEEPING PILLS AS SOON AS POSSIBLE BECAUSE HER MOTHER HASN'T BEEN SLEEPING      No indicators present

## 2021-12-01 NOTE — SWALLOW BEDSIDE ASSESSMENT ADULT - COMMENTS
As per Progress Note 12/1/21, pt is a "71F PMH dementia (ambulatory, largely independent at bl) p/w AMS, found to meet SIRS criteria (febrile, tachycardic, tachypneic) 2/2 suspected pulmonary source on CXR iso +RVP SARSCOV2".    WBC is WFL. CXR 11/30/21 revealed "Unilateral opacities peripheral and central may represent unusual covid 19 pneumonia."    Patient seen at bedside, awake/alert, during initial clinical swallow evaluation this AM. Patient noted with confusion, difficulty following directions, and answering questions. Patient required frequent repositioning and head/trunk support from SLP and RN. Patient with initial refusal of trials, however given max SLP prompting and verbal encouragement, patient accepted PO trials. As per Progress Note 12/1/21, pt is a "71F PMH dementia (ambulatory, largely independent at bl) p/w AMS, found to meet SIRS criteria (febrile, tachycardic, tachypneic) 2/2 suspected pulmonary source on CXR iso +RVP SARSCOV2".    WBC is WFL. CXR 11/30/21 revealed "Unilateral opacities peripheral and central may represent unusual covid 19 pneumonia."    Patient seen at bedside, awake/alert, during initial clinical swallow evaluation this AM. Patient noted with confusion, difficulty following directions, and answering questions. Pt with head down position, requiring SLP and RN repositioning assistance to bring head to neutral position. Patient requiring consistent multiple attempt to reposition throughout the assessment. Patient with initial refusal of trials, however given max SLP prompting and verbal encouragement, patient accepted PO trials.

## 2021-12-01 NOTE — ED ADULT NURSE REASSESSMENT NOTE - NS ED NURSE REASSESS COMMENT FT1
Pt awake and alert, resting on stretcher. Respirations even and unlabored, no accessory muscle use, sating 98% on room air. Pt well appearing and in no acute distress at this time. Vital signs as per flowsheet. Pt moved to floor by transport via stretcher.

## 2021-12-01 NOTE — PROGRESS NOTE ADULT - PROBLEM SELECTOR PLAN 1
meeting 3 SIRS criteria (febrile, tachycardic, tachypneic)  iso RVP+ SARSCOV2 however CXR patchy R lung opacities less typical, possibly iso bacterial PNA  lactate wnl  s/p CTX, azithro, 1L NS in ED  - CT chest Opacities throughout the right lung representing multifocal pneumonia likely corresponding to history of Covid, will dc abx  -start remdesivir as pt is symptomatic w/ fever and lethargy  - obtain urine legionella, strep  - CTH no acute path  -DVT ppx with lovenox  -trend temp curve meeting 3 SIRS criteria (febrile, tachycardic, tachypneic)  iso RVP+ SARSCOV2 however CXR patchy R lung opacities less typical, possibly iso bacterial PNA  lactate wnl  s/p CTX, azithro, 1L NS in ED  - CT chest Opacities throughout the right lung representing multifocal pneumonia likely corresponding to history of Covid, although other superimposed infectious etiologies cannot be excluded.  -start remdesivir as pt is symptomatic w/ fever and lethargy  -c/w ceftriaxone/zithromax for now  - obtain urine legionella, strep  - CTH no acute path  -DVT ppx with lovenox  -trend temp curve

## 2021-12-01 NOTE — PROGRESS NOTE ADULT - PROBLEM SELECTOR PLAN 2
RVP+ SARSCOV2 iso non-vaccinated status  meeting SIRS as above, however SpO2% adequate on RA  -currently on RA, doesn't qualify for oxygen, O2 if pt develops hypoxia requiring supplemental oxygen  -trend inflammatory markers crp, ferritin, d-dimer q72h  -PT recs rehab, will need 10 day isolation and repeat covid swab on 12/8   isolation precautions  -CT chest as above  - monitor SpO2%, supplemental O2 prn RVP+ SARSCOV2 iso non-vaccinated status  meeting SIRS as above, however SpO2% adequate on RA  -currently on RA, doesn't qualify for oxygen, O2 if pt develops hypoxia requiring supplemental oxygen  -trend inflammatory markers crp, ferritin, d-dimer q72h  -PT recs rehab, will need 10 day isolation and repeat covid swab on 12/8   isolation precautions  -CT chest as above  -c/w abx, remdesivir  - monitor SpO2%, supplemental O2 prn RVP+ SARSCOV2 iso non-vaccinated status  meeting SIRS as above, however SpO2% adequate on RA  -currently on RA, doesn't qualify for oxygen, O2 if pt develops hypoxia requiring supplemental oxygen  -trend inflammatory markers crp, ferritin, d-dimer q72h  -PT recs rehab, will need 10 day isolation and repeat covid swab on 12/9   isolation precautions  -CT chest as above  -c/w abx, remdesivir  - monitor SpO2%, supplemental O2 prn

## 2021-12-02 LAB
A1C WITH ESTIMATED AVERAGE GLUCOSE RESULT: 5.8 % — HIGH (ref 4–5.6)
ALBUMIN SERPL ELPH-MCNC: 3 G/DL — LOW (ref 3.3–5)
ALP SERPL-CCNC: 49 U/L — SIGNIFICANT CHANGE UP (ref 40–120)
ALT FLD-CCNC: 26 U/L — SIGNIFICANT CHANGE UP (ref 4–33)
ANION GAP SERPL CALC-SCNC: 12 MMOL/L — SIGNIFICANT CHANGE UP (ref 7–14)
AST SERPL-CCNC: 29 U/L — SIGNIFICANT CHANGE UP (ref 4–32)
BASOPHILS # BLD AUTO: 0.07 K/UL — SIGNIFICANT CHANGE UP (ref 0–0.2)
BASOPHILS NFR BLD AUTO: 1.8 % — SIGNIFICANT CHANGE UP (ref 0–2)
BILIRUB SERPL-MCNC: 0.7 MG/DL — SIGNIFICANT CHANGE UP (ref 0.2–1.2)
BUN SERPL-MCNC: 12 MG/DL — SIGNIFICANT CHANGE UP (ref 7–23)
CALCIUM SERPL-MCNC: 8.7 MG/DL — SIGNIFICANT CHANGE UP (ref 8.4–10.5)
CHLORIDE SERPL-SCNC: 102 MMOL/L — SIGNIFICANT CHANGE UP (ref 98–107)
CK SERPL-CCNC: 195 U/L — HIGH (ref 25–170)
CO2 SERPL-SCNC: 18 MMOL/L — LOW (ref 22–31)
CREAT SERPL-MCNC: 0.66 MG/DL — SIGNIFICANT CHANGE UP (ref 0.5–1.3)
EOSINOPHIL # BLD AUTO: 0 K/UL — SIGNIFICANT CHANGE UP (ref 0–0.5)
EOSINOPHIL NFR BLD AUTO: 0 % — SIGNIFICANT CHANGE UP (ref 0–6)
ESTIMATED AVERAGE GLUCOSE: 120 — SIGNIFICANT CHANGE UP
GLUCOSE SERPL-MCNC: 81 MG/DL — SIGNIFICANT CHANGE UP (ref 70–99)
HCT VFR BLD CALC: 34.1 % — LOW (ref 34.5–45)
HGB BLD-MCNC: 11.1 G/DL — LOW (ref 11.5–15.5)
IANC: 2.39 K/UL — SIGNIFICANT CHANGE UP (ref 1.5–8.5)
LEGIONELLA AG UR QL: NEGATIVE — SIGNIFICANT CHANGE UP
LYMPHOCYTES # BLD AUTO: 0.29 K/UL — LOW (ref 1–3.3)
LYMPHOCYTES # BLD AUTO: 7.2 % — LOW (ref 13–44)
MAGNESIUM SERPL-MCNC: 2.2 MG/DL — SIGNIFICANT CHANGE UP (ref 1.6–2.6)
MCHC RBC-ENTMCNC: 31 PG — SIGNIFICANT CHANGE UP (ref 27–34)
MCHC RBC-ENTMCNC: 32.6 GM/DL — SIGNIFICANT CHANGE UP (ref 32–36)
MCV RBC AUTO: 95.3 FL — SIGNIFICANT CHANGE UP (ref 80–100)
MONOCYTES # BLD AUTO: 0.48 K/UL — SIGNIFICANT CHANGE UP (ref 0–0.9)
MONOCYTES NFR BLD AUTO: 11.7 % — SIGNIFICANT CHANGE UP (ref 2–14)
NEUTROPHILS # BLD AUTO: 2.65 K/UL — SIGNIFICANT CHANGE UP (ref 1.8–7.4)
NEUTROPHILS NFR BLD AUTO: 64.9 % — SIGNIFICANT CHANGE UP (ref 43–77)
PHOSPHATE SERPL-MCNC: 2.7 MG/DL — SIGNIFICANT CHANGE UP (ref 2.5–4.5)
PLATELET # BLD AUTO: 293 K/UL — SIGNIFICANT CHANGE UP (ref 150–400)
POTASSIUM SERPL-MCNC: 4.5 MMOL/L — SIGNIFICANT CHANGE UP (ref 3.5–5.3)
POTASSIUM SERPL-SCNC: 4.5 MMOL/L — SIGNIFICANT CHANGE UP (ref 3.5–5.3)
PROT SERPL-MCNC: 6.7 G/DL — SIGNIFICANT CHANGE UP (ref 6–8.3)
RBC # BLD: 3.58 M/UL — LOW (ref 3.8–5.2)
RBC # FLD: 12.8 % — SIGNIFICANT CHANGE UP (ref 10.3–14.5)
S PNEUM AG UR QL: NEGATIVE — SIGNIFICANT CHANGE UP
SODIUM SERPL-SCNC: 132 MMOL/L — LOW (ref 135–145)
WBC # BLD: 4.08 K/UL — SIGNIFICANT CHANGE UP (ref 3.8–10.5)
WBC # FLD AUTO: 4.08 K/UL — SIGNIFICANT CHANGE UP (ref 3.8–10.5)

## 2021-12-02 PROCEDURE — 99232 SBSQ HOSP IP/OBS MODERATE 35: CPT

## 2021-12-02 RX ORDER — SODIUM CHLORIDE 9 MG/ML
1000 INJECTION INTRAMUSCULAR; INTRAVENOUS; SUBCUTANEOUS
Refills: 0 | Status: DISCONTINUED | OUTPATIENT
Start: 2021-12-02 | End: 2021-12-08

## 2021-12-02 RX ADMIN — Medication 100 MILLIGRAM(S): at 08:59

## 2021-12-02 RX ADMIN — CEFTRIAXONE 100 MILLIGRAM(S): 500 INJECTION, POWDER, FOR SOLUTION INTRAMUSCULAR; INTRAVENOUS at 21:07

## 2021-12-02 RX ADMIN — SODIUM CHLORIDE 65 MILLILITER(S): 9 INJECTION INTRAMUSCULAR; INTRAVENOUS; SUBCUTANEOUS at 19:46

## 2021-12-02 RX ADMIN — REMDESIVIR 500 MILLIGRAM(S): 5 INJECTION INTRAVENOUS at 13:17

## 2021-12-02 RX ADMIN — Medication 1 TABLET(S): at 05:41

## 2021-12-02 RX ADMIN — AZITHROMYCIN 255 MILLIGRAM(S): 500 TABLET, FILM COATED ORAL at 23:08

## 2021-12-02 RX ADMIN — Medication 1 TABLET(S): at 16:15

## 2021-12-02 RX ADMIN — POLYETHYLENE GLYCOL 3350 17 GRAM(S): 17 POWDER, FOR SOLUTION ORAL at 08:59

## 2021-12-02 RX ADMIN — ENOXAPARIN SODIUM 40 MILLIGRAM(S): 100 INJECTION SUBCUTANEOUS at 08:59

## 2021-12-02 NOTE — DIETITIAN INITIAL EVALUATION ADULT. - OTHER INFO
70 y/o F with dementia p/w AMS and admitted with sepsis 2/2 PNA and COVID+. Pt is A&Ox1; collateral obtained from RN. Pt is eating 50% of her meals. Pureed foods per SLP 12/1 assessment. Denies any GI issues (nausea/vomiting/diarrhea/constipation.) NKFA. Unable to assess recent wt history. Per HIE, 2017 wt was 45 kg.

## 2021-12-02 NOTE — DIETITIAN INITIAL EVALUATION ADULT. - PROBLEM SELECTOR PLAN 2
RVP+ SARSCOV2 iso non-vaccinated status  meeting SIRS as above, however SpO2% adequate on RA  - consider remdesivir, dexamethasone if beginning to require supplemental O2  - bl CBC w/ diff, CMP, trop obtained; will obtain LDH, procal, CPK, CRP, ferritin, D-Dimer, coags, A1c  - rpt D-Dimer 48-72h  - isolation precautions  - VTE holding pending CTH r/o bleed, if w/o concerning findings can start lovenox 40mg QD pending D-Dimer (can adjust lovenox dose once obtained)  - CT Chest NC as above  - monitor SpO2%, supplemental O2 prn

## 2021-12-02 NOTE — DIETITIAN INITIAL EVALUATION ADULT. - PROBLEM SELECTOR PLAN 7
- consider chemical DVT ppx as above after CTH NC r/o bleed  - regular diet pending formal swallow eval  - fall precaution  - aspiration precaution  - PT c/s

## 2021-12-02 NOTE — DIETITIAN INITIAL EVALUATION ADULT. - PROBLEM SELECTOR PLAN 6
W/o BM x 2 wks per daughter and unclear flatus  Abd exam w/o abn  - monitor for BMs  - start senna, miralax, titrate per BM

## 2021-12-02 NOTE — DIETITIAN INITIAL EVALUATION ADULT. - PERTINENT MEDS FT
MEDICATIONS  (STANDING):  azithromycin  IVPB 500 milliGRAM(s) IV Intermittent every 24 hours  cefTRIAXone   IVPB 1000 milliGRAM(s) IV Intermittent every 24 hours  enoxaparin Injectable 40 milliGRAM(s) SubCutaneous daily  lactobacillus acidophilus 1 Tablet(s) Oral two times a day  polyethylene glycol 3350 17 Gram(s) Oral daily  remdesivir  IVPB 100 milliGRAM(s) IV Intermittent every 24 hours  remdesivir  IVPB   IV Intermittent   senna 2 Tablet(s) Oral at bedtime  thiamine 100 milliGRAM(s) Oral daily

## 2021-12-02 NOTE — DIETITIAN INITIAL EVALUATION ADULT. - PROBLEM SELECTOR PLAN 5
Per daughter iso decreased po intake  SCr 0.98 unclear bl  - UA w/ mod bacteria, f/u UCx and on abx as above  - monitor Is/Os  - BS q8h

## 2021-12-02 NOTE — PROGRESS NOTE ADULT - PROBLEM SELECTOR PLAN 1
meeting 3 SIRS criteria (febrile, tachycardic, tachypneic)  iso RVP+ SARSCOV2 however CXR patchy R lung opacities less typical, possibly iso bacterial PNA  lactate wnl  s/p CTX, azithro, 1L NS in ED  - CT chest Opacities throughout the right lung representing multifocal pneumonia likely corresponding to history of Covid, although other superimposed infectious etiologies cannot be excluded.  -start remdesivir as pt is symptomatic w/ fever and lethargy  -c/w ceftriaxone/zithromax x5-7 days, probiotic  - urine strep neg, f/u urine legionella ag  - CTH no acute path  -DVT ppx with lovenox  -trend temp curve

## 2021-12-02 NOTE — PROGRESS NOTE ADULT - PROBLEM SELECTOR PLAN 2
RVP+ SARSCOV2 iso non-vaccinated status  meeting SIRS as above, however SpO2% adequate on RA  -currently on RA, doesn't qualify for oxygen, O2 if pt develops hypoxia requiring supplemental oxygen  -trend inflammatory markers crp, ferritin, d-dimer q72h  -PT recs rehab, will need 10 day isolation and repeat covid swab on 12/9   isolation precautions  -CT chest as above  -c/w abx, remdesivir  - monitor SpO2%, supplemental O2 prn

## 2021-12-02 NOTE — DIETITIAN INITIAL EVALUATION ADULT. - PROBLEM SELECTOR PLAN 1
meeting 3 SIRS criteria (febrile, tachycardic, tachypneic)  iso RVP+ SARSCOV2 however CXR patchy R lung opacities less typical, possibly iso bacterial PNA  lactate wnl  s/p CTX, azithro, 1L NS in ED  - c/w CTX, azithro  - obtain ulegionella, ustrep  - obtain CT Chest NC for further characterization  - BPs wnl, hold mIVF  - VS q4h  - tyl 650mg po q6h prn fever  - monitor temp and WBC curve

## 2021-12-03 LAB
ALBUMIN SERPL ELPH-MCNC: 2.9 G/DL — LOW (ref 3.3–5)
ALBUMIN SERPL ELPH-MCNC: 3.2 G/DL — LOW (ref 3.3–5)
ALP SERPL-CCNC: 45 U/L — SIGNIFICANT CHANGE UP (ref 40–120)
ALP SERPL-CCNC: 46 U/L — SIGNIFICANT CHANGE UP (ref 40–120)
ALT FLD-CCNC: 22 U/L — SIGNIFICANT CHANGE UP (ref 4–33)
ALT FLD-CCNC: 23 U/L — SIGNIFICANT CHANGE UP (ref 4–33)
ANION GAP SERPL CALC-SCNC: 8 MMOL/L — SIGNIFICANT CHANGE UP (ref 7–14)
AST SERPL-CCNC: 17 U/L — SIGNIFICANT CHANGE UP (ref 4–32)
AST SERPL-CCNC: 17 U/L — SIGNIFICANT CHANGE UP (ref 4–32)
BASOPHILS # BLD AUTO: 0.03 K/UL — SIGNIFICANT CHANGE UP (ref 0–0.2)
BASOPHILS NFR BLD AUTO: 0.7 % — SIGNIFICANT CHANGE UP (ref 0–2)
BILIRUB DIRECT SERPL-MCNC: <0.2 MG/DL — SIGNIFICANT CHANGE UP (ref 0–0.3)
BILIRUB INDIRECT FLD-MCNC: >0.3 MG/DL — SIGNIFICANT CHANGE UP (ref 0–1)
BILIRUB SERPL-MCNC: 0.5 MG/DL — SIGNIFICANT CHANGE UP (ref 0.2–1.2)
BILIRUB SERPL-MCNC: 0.6 MG/DL — SIGNIFICANT CHANGE UP (ref 0.2–1.2)
BUN SERPL-MCNC: 11 MG/DL — SIGNIFICANT CHANGE UP (ref 7–23)
CALCIUM SERPL-MCNC: 8.6 MG/DL — SIGNIFICANT CHANGE UP (ref 8.4–10.5)
CHLORIDE SERPL-SCNC: 107 MMOL/L — SIGNIFICANT CHANGE UP (ref 98–107)
CK SERPL-CCNC: 125 U/L — SIGNIFICANT CHANGE UP (ref 25–170)
CO2 SERPL-SCNC: 25 MMOL/L — SIGNIFICANT CHANGE UP (ref 22–31)
CREAT SERPL-MCNC: 0.67 MG/DL — SIGNIFICANT CHANGE UP (ref 0.5–1.3)
CREAT SERPL-MCNC: 0.69 MG/DL — SIGNIFICANT CHANGE UP (ref 0.5–1.3)
D DIMER BLD IA.RAPID-MCNC: 846 NG/ML DDU — HIGH
EOSINOPHIL # BLD AUTO: 0.08 K/UL — SIGNIFICANT CHANGE UP (ref 0–0.5)
EOSINOPHIL NFR BLD AUTO: 1.8 % — SIGNIFICANT CHANGE UP (ref 0–6)
GLUCOSE SERPL-MCNC: 88 MG/DL — SIGNIFICANT CHANGE UP (ref 70–99)
HCT VFR BLD CALC: 32.1 % — LOW (ref 34.5–45)
HGB BLD-MCNC: 11 G/DL — LOW (ref 11.5–15.5)
IANC: 2.18 K/UL — SIGNIFICANT CHANGE UP (ref 1.5–8.5)
IMM GRANULOCYTES NFR BLD AUTO: 0.5 % — SIGNIFICANT CHANGE UP (ref 0–1.5)
LDH SERPL L TO P-CCNC: 232 U/L — HIGH (ref 135–225)
LYMPHOCYTES # BLD AUTO: 1.43 K/UL — SIGNIFICANT CHANGE UP (ref 1–3.3)
LYMPHOCYTES # BLD AUTO: 32.4 % — SIGNIFICANT CHANGE UP (ref 13–44)
MAGNESIUM SERPL-MCNC: 2.2 MG/DL — SIGNIFICANT CHANGE UP (ref 1.6–2.6)
MCHC RBC-ENTMCNC: 31.9 PG — SIGNIFICANT CHANGE UP (ref 27–34)
MCHC RBC-ENTMCNC: 34.3 GM/DL — SIGNIFICANT CHANGE UP (ref 32–36)
MCV RBC AUTO: 93 FL — SIGNIFICANT CHANGE UP (ref 80–100)
MONOCYTES # BLD AUTO: 0.68 K/UL — SIGNIFICANT CHANGE UP (ref 0–0.9)
MONOCYTES NFR BLD AUTO: 15.4 % — HIGH (ref 2–14)
NEUTROPHILS # BLD AUTO: 2.18 K/UL — SIGNIFICANT CHANGE UP (ref 1.8–7.4)
NEUTROPHILS NFR BLD AUTO: 49.2 % — SIGNIFICANT CHANGE UP (ref 43–77)
NRBC # BLD: 0 /100 WBCS — SIGNIFICANT CHANGE UP
NRBC # FLD: 0 K/UL — SIGNIFICANT CHANGE UP
PHOSPHATE SERPL-MCNC: 2.4 MG/DL — LOW (ref 2.5–4.5)
PLATELET # BLD AUTO: 348 K/UL — SIGNIFICANT CHANGE UP (ref 150–400)
POTASSIUM SERPL-MCNC: 3.9 MMOL/L — SIGNIFICANT CHANGE UP (ref 3.5–5.3)
POTASSIUM SERPL-SCNC: 3.9 MMOL/L — SIGNIFICANT CHANGE UP (ref 3.5–5.3)
PROCALCITONIN SERPL-MCNC: 0.11 NG/ML — HIGH (ref 0.02–0.1)
PROT SERPL-MCNC: 6.2 G/DL — SIGNIFICANT CHANGE UP (ref 6–8.3)
PROT SERPL-MCNC: 6.2 G/DL — SIGNIFICANT CHANGE UP (ref 6–8.3)
RBC # BLD: 3.45 M/UL — LOW (ref 3.8–5.2)
RBC # FLD: 12.9 % — SIGNIFICANT CHANGE UP (ref 10.3–14.5)
SODIUM SERPL-SCNC: 140 MMOL/L — SIGNIFICANT CHANGE UP (ref 135–145)
WBC # BLD: 4.42 K/UL — SIGNIFICANT CHANGE UP (ref 3.8–10.5)
WBC # FLD AUTO: 4.42 K/UL — SIGNIFICANT CHANGE UP (ref 3.8–10.5)

## 2021-12-03 PROCEDURE — 99232 SBSQ HOSP IP/OBS MODERATE 35: CPT

## 2021-12-03 RX ORDER — SODIUM,POTASSIUM PHOSPHATES 278-250MG
1 POWDER IN PACKET (EA) ORAL
Refills: 0 | Status: DISCONTINUED | OUTPATIENT
Start: 2021-12-03 | End: 2021-12-03

## 2021-12-03 RX ADMIN — SENNA PLUS 2 TABLET(S): 8.6 TABLET ORAL at 21:36

## 2021-12-03 RX ADMIN — Medication 1 TABLET(S): at 05:19

## 2021-12-03 RX ADMIN — CEFTRIAXONE 100 MILLIGRAM(S): 500 INJECTION, POWDER, FOR SOLUTION INTRAMUSCULAR; INTRAVENOUS at 21:36

## 2021-12-03 RX ADMIN — REMDESIVIR 500 MILLIGRAM(S): 5 INJECTION INTRAVENOUS at 12:15

## 2021-12-03 RX ADMIN — POLYETHYLENE GLYCOL 3350 17 GRAM(S): 17 POWDER, FOR SOLUTION ORAL at 12:15

## 2021-12-03 RX ADMIN — Medication 1 TABLET(S): at 18:17

## 2021-12-03 RX ADMIN — ENOXAPARIN SODIUM 40 MILLIGRAM(S): 100 INJECTION SUBCUTANEOUS at 12:15

## 2021-12-03 RX ADMIN — Medication 1 PACKET(S): at 10:22

## 2021-12-03 RX ADMIN — Medication 100 MILLIGRAM(S): at 12:15

## 2021-12-03 NOTE — PROGRESS NOTE ADULT - PROBLEM SELECTOR PLAN 1
meeting 3 SIRS criteria (febrile, tachycardic, tachypneic)  iso RVP+ SARSCOV2 however CXR patchy R lung opacities less typical, possibly iso bacterial PNA  lactate wnl  s/p CTX, azithro, 1L NS in ED  - CT chest Opacities throughout the right lung representing multifocal pneumonia likely corresponding to history of Covid, although other superimposed infectious etiologies cannot be excluded.  -c/w remdesivir (12/2-12/5) as pt is symptomatic w/ fever and lethargy  -c/w ceftriaxone x7 days, probiotic  - urine strep and legionella ag neg, dc'd zithromax, Ucx and Bcx (11/30) ngtd  - CTH no acute path  -DVT ppx with lovenox  -trend temp curve meeting 3 SIRS criteria (febrile, tachycardic, tachypneic)  iso RVP+ SARSCOV2 however CXR patchy R lung opacities less typical, possibly iso bacterial PNA  lactate wnl  s/p CTX, azithro, 1L NS in ED  - CT chest Opacities throughout the right lung representing multifocal pneumonia likely corresponding to history of Covid, although other superimposed infectious etiologies cannot be excluded.  -c/w remdesivir (12/2-12/5) as pt is symptomatic w/ fever and lethargy  -c/w ceftriaxone x7 days, probiotic  - urine strep and legionella ag neg, dc'd zithromax, Ucx and Bcx (11/30) ngtd  - CTH no acute path  -DVT ppx with lovenox  -trend temp curve  -dispo: rehab pending repeat covid swab 12/9, 10 day quarantine in house, updated daughter on 12/3

## 2021-12-04 LAB
ALBUMIN SERPL ELPH-MCNC: 2.9 G/DL — LOW (ref 3.3–5)
ALBUMIN SERPL ELPH-MCNC: 3.1 G/DL — LOW (ref 3.3–5)
ALP SERPL-CCNC: 49 U/L — SIGNIFICANT CHANGE UP (ref 40–120)
ALP SERPL-CCNC: 49 U/L — SIGNIFICANT CHANGE UP (ref 40–120)
ALT FLD-CCNC: 23 U/L — SIGNIFICANT CHANGE UP (ref 4–33)
ALT FLD-CCNC: 26 U/L — SIGNIFICANT CHANGE UP (ref 4–33)
ANION GAP SERPL CALC-SCNC: 9 MMOL/L — SIGNIFICANT CHANGE UP (ref 7–14)
AST SERPL-CCNC: 26 U/L — SIGNIFICANT CHANGE UP (ref 4–32)
AST SERPL-CCNC: 26 U/L — SIGNIFICANT CHANGE UP (ref 4–32)
BASOPHILS # BLD AUTO: 0.02 K/UL — SIGNIFICANT CHANGE UP (ref 0–0.2)
BASOPHILS NFR BLD AUTO: 0.5 % — SIGNIFICANT CHANGE UP (ref 0–2)
BILIRUB DIRECT SERPL-MCNC: <0.2 MG/DL — SIGNIFICANT CHANGE UP (ref 0–0.3)
BILIRUB INDIRECT FLD-MCNC: >0.3 MG/DL — SIGNIFICANT CHANGE UP (ref 0–1)
BILIRUB SERPL-MCNC: 0.5 MG/DL — SIGNIFICANT CHANGE UP (ref 0.2–1.2)
BILIRUB SERPL-MCNC: 0.5 MG/DL — SIGNIFICANT CHANGE UP (ref 0.2–1.2)
BUN SERPL-MCNC: 9 MG/DL — SIGNIFICANT CHANGE UP (ref 7–23)
CALCIUM SERPL-MCNC: 8.6 MG/DL — SIGNIFICANT CHANGE UP (ref 8.4–10.5)
CHLORIDE SERPL-SCNC: 104 MMOL/L — SIGNIFICANT CHANGE UP (ref 98–107)
CO2 SERPL-SCNC: 24 MMOL/L — SIGNIFICANT CHANGE UP (ref 22–31)
CREAT SERPL-MCNC: 0.68 MG/DL — SIGNIFICANT CHANGE UP (ref 0.5–1.3)
CREAT SERPL-MCNC: 0.69 MG/DL — SIGNIFICANT CHANGE UP (ref 0.5–1.3)
EOSINOPHIL # BLD AUTO: 0.07 K/UL — SIGNIFICANT CHANGE UP (ref 0–0.5)
EOSINOPHIL NFR BLD AUTO: 1.8 % — SIGNIFICANT CHANGE UP (ref 0–6)
GLUCOSE SERPL-MCNC: 86 MG/DL — SIGNIFICANT CHANGE UP (ref 70–99)
HCT VFR BLD CALC: 32.1 % — LOW (ref 34.5–45)
HGB BLD-MCNC: 10.9 G/DL — LOW (ref 11.5–15.5)
IANC: 1.58 K/UL — SIGNIFICANT CHANGE UP (ref 1.5–8.5)
IMM GRANULOCYTES NFR BLD AUTO: 0.8 % — SIGNIFICANT CHANGE UP (ref 0–1.5)
LYMPHOCYTES # BLD AUTO: 1.6 K/UL — SIGNIFICANT CHANGE UP (ref 1–3.3)
LYMPHOCYTES # BLD AUTO: 41.3 % — SIGNIFICANT CHANGE UP (ref 13–44)
MAGNESIUM SERPL-MCNC: 2.1 MG/DL — SIGNIFICANT CHANGE UP (ref 1.6–2.6)
MCHC RBC-ENTMCNC: 31 PG — SIGNIFICANT CHANGE UP (ref 27–34)
MCHC RBC-ENTMCNC: 34 GM/DL — SIGNIFICANT CHANGE UP (ref 32–36)
MCV RBC AUTO: 91.2 FL — SIGNIFICANT CHANGE UP (ref 80–100)
MONOCYTES # BLD AUTO: 0.57 K/UL — SIGNIFICANT CHANGE UP (ref 0–0.9)
MONOCYTES NFR BLD AUTO: 14.7 % — HIGH (ref 2–14)
NEUTROPHILS # BLD AUTO: 1.58 K/UL — LOW (ref 1.8–7.4)
NEUTROPHILS NFR BLD AUTO: 40.9 % — LOW (ref 43–77)
NRBC # BLD: 0 /100 WBCS — SIGNIFICANT CHANGE UP
NRBC # FLD: 0 K/UL — SIGNIFICANT CHANGE UP
PHOSPHATE SERPL-MCNC: 2.8 MG/DL — SIGNIFICANT CHANGE UP (ref 2.5–4.5)
PLATELET # BLD AUTO: 374 K/UL — SIGNIFICANT CHANGE UP (ref 150–400)
POTASSIUM SERPL-MCNC: 4.1 MMOL/L — SIGNIFICANT CHANGE UP (ref 3.5–5.3)
POTASSIUM SERPL-SCNC: 4.1 MMOL/L — SIGNIFICANT CHANGE UP (ref 3.5–5.3)
PROT SERPL-MCNC: 6.1 G/DL — SIGNIFICANT CHANGE UP (ref 6–8.3)
PROT SERPL-MCNC: 6.3 G/DL — SIGNIFICANT CHANGE UP (ref 6–8.3)
RBC # BLD: 3.52 M/UL — LOW (ref 3.8–5.2)
RBC # FLD: 12.8 % — SIGNIFICANT CHANGE UP (ref 10.3–14.5)
SODIUM SERPL-SCNC: 137 MMOL/L — SIGNIFICANT CHANGE UP (ref 135–145)
WBC # BLD: 3.87 K/UL — SIGNIFICANT CHANGE UP (ref 3.8–10.5)
WBC # FLD AUTO: 3.87 K/UL — SIGNIFICANT CHANGE UP (ref 3.8–10.5)

## 2021-12-04 PROCEDURE — 99232 SBSQ HOSP IP/OBS MODERATE 35: CPT

## 2021-12-04 RX ORDER — POTASSIUM CHLORIDE 20 MEQ
40 PACKET (EA) ORAL EVERY 4 HOURS
Refills: 0 | Status: DISCONTINUED | OUTPATIENT
Start: 2021-12-04 | End: 2021-12-04

## 2021-12-04 RX ADMIN — Medication 100 MILLIGRAM(S): at 13:46

## 2021-12-04 RX ADMIN — POLYETHYLENE GLYCOL 3350 17 GRAM(S): 17 POWDER, FOR SOLUTION ORAL at 13:45

## 2021-12-04 RX ADMIN — Medication 1 TABLET(S): at 17:56

## 2021-12-04 RX ADMIN — SENNA PLUS 2 TABLET(S): 8.6 TABLET ORAL at 22:21

## 2021-12-04 RX ADMIN — CEFTRIAXONE 100 MILLIGRAM(S): 500 INJECTION, POWDER, FOR SOLUTION INTRAMUSCULAR; INTRAVENOUS at 22:21

## 2021-12-04 RX ADMIN — REMDESIVIR 500 MILLIGRAM(S): 5 INJECTION INTRAVENOUS at 13:43

## 2021-12-04 RX ADMIN — Medication 1 TABLET(S): at 05:09

## 2021-12-04 RX ADMIN — ENOXAPARIN SODIUM 40 MILLIGRAM(S): 100 INJECTION SUBCUTANEOUS at 13:45

## 2021-12-04 NOTE — PROGRESS NOTE ADULT - PROBLEM SELECTOR PLAN 1
meeting 3 SIRS criteria (febrile, tachycardic, tachypneic)  iso RVP+ SARSCOV2 however CXR patchy R lung opacities less typical, possibly iso bacterial PNA  lactate wnl  s/p CTX, azithro, 1L NS in ED  - CT chest Opacities throughout the right lung representing multifocal pneumonia likely corresponding to history of Covid, although other superimposed infectious etiologies cannot be excluded.  -c/w remdesivir (12/2-12/5) as pt is symptomatic w/ fever and lethargy  -c/w ceftriaxone x7 days, probiotic  - urine strep and legionella ag neg, dc'd zithromax, Ucx and Bcx (11/30) ngtd  - CTH no acute path  -DVT ppx with lovenox  -trend temp curve  -dispo: rehab pending repeat covid swab 12/9, 10 day quarantine in house, updated daughter on 12/3

## 2021-12-05 LAB
ALBUMIN SERPL ELPH-MCNC: 3.3 G/DL — SIGNIFICANT CHANGE UP (ref 3.3–5)
ALP SERPL-CCNC: 57 U/L — SIGNIFICANT CHANGE UP (ref 40–120)
ALT FLD-CCNC: 42 U/L — HIGH (ref 4–33)
ANION GAP SERPL CALC-SCNC: 11 MMOL/L — SIGNIFICANT CHANGE UP (ref 7–14)
AST SERPL-CCNC: 62 U/L — HIGH (ref 4–32)
BILIRUB DIRECT SERPL-MCNC: <0.2 MG/DL — SIGNIFICANT CHANGE UP (ref 0–0.3)
BILIRUB INDIRECT FLD-MCNC: >0.4 MG/DL — SIGNIFICANT CHANGE UP (ref 0–1)
BILIRUB SERPL-MCNC: 0.6 MG/DL — SIGNIFICANT CHANGE UP (ref 0.2–1.2)
BUN SERPL-MCNC: 8 MG/DL — SIGNIFICANT CHANGE UP (ref 7–23)
CALCIUM SERPL-MCNC: 9.2 MG/DL — SIGNIFICANT CHANGE UP (ref 8.4–10.5)
CHLORIDE SERPL-SCNC: 102 MMOL/L — SIGNIFICANT CHANGE UP (ref 98–107)
CO2 SERPL-SCNC: 23 MMOL/L — SIGNIFICANT CHANGE UP (ref 22–31)
CREAT SERPL-MCNC: 0.66 MG/DL — SIGNIFICANT CHANGE UP (ref 0.5–1.3)
CREAT SERPL-MCNC: 0.68 MG/DL — SIGNIFICANT CHANGE UP (ref 0.5–1.3)
CULTURE RESULTS: SIGNIFICANT CHANGE UP
CULTURE RESULTS: SIGNIFICANT CHANGE UP
GLUCOSE SERPL-MCNC: 91 MG/DL — SIGNIFICANT CHANGE UP (ref 70–99)
HCT VFR BLD CALC: 35 % — SIGNIFICANT CHANGE UP (ref 34.5–45)
HGB BLD-MCNC: 12 G/DL — SIGNIFICANT CHANGE UP (ref 11.5–15.5)
MAGNESIUM SERPL-MCNC: 2.1 MG/DL — SIGNIFICANT CHANGE UP (ref 1.6–2.6)
MCHC RBC-ENTMCNC: 31.3 PG — SIGNIFICANT CHANGE UP (ref 27–34)
MCHC RBC-ENTMCNC: 34.3 GM/DL — SIGNIFICANT CHANGE UP (ref 32–36)
MCV RBC AUTO: 91.4 FL — SIGNIFICANT CHANGE UP (ref 80–100)
NRBC # BLD: 0 /100 WBCS — SIGNIFICANT CHANGE UP
NRBC # FLD: 0 K/UL — SIGNIFICANT CHANGE UP
PHOSPHATE SERPL-MCNC: 2.6 MG/DL — SIGNIFICANT CHANGE UP (ref 2.5–4.5)
PLATELET # BLD AUTO: 426 K/UL — HIGH (ref 150–400)
POTASSIUM SERPL-MCNC: 4 MMOL/L — SIGNIFICANT CHANGE UP (ref 3.5–5.3)
POTASSIUM SERPL-SCNC: 4 MMOL/L — SIGNIFICANT CHANGE UP (ref 3.5–5.3)
PROT SERPL-MCNC: 6.7 G/DL — SIGNIFICANT CHANGE UP (ref 6–8.3)
RBC # BLD: 3.83 M/UL — SIGNIFICANT CHANGE UP (ref 3.8–5.2)
RBC # FLD: 12.7 % — SIGNIFICANT CHANGE UP (ref 10.3–14.5)
SODIUM SERPL-SCNC: 136 MMOL/L — SIGNIFICANT CHANGE UP (ref 135–145)
SPECIMEN SOURCE: SIGNIFICANT CHANGE UP
SPECIMEN SOURCE: SIGNIFICANT CHANGE UP
WBC # BLD: 4.67 K/UL — SIGNIFICANT CHANGE UP (ref 3.8–10.5)
WBC # FLD AUTO: 4.67 K/UL — SIGNIFICANT CHANGE UP (ref 3.8–10.5)

## 2021-12-05 PROCEDURE — 99232 SBSQ HOSP IP/OBS MODERATE 35: CPT

## 2021-12-05 RX ADMIN — Medication 100 MILLIGRAM(S): at 13:09

## 2021-12-05 RX ADMIN — REMDESIVIR 500 MILLIGRAM(S): 5 INJECTION INTRAVENOUS at 13:08

## 2021-12-05 RX ADMIN — Medication 1 TABLET(S): at 05:51

## 2021-12-05 RX ADMIN — SENNA PLUS 2 TABLET(S): 8.6 TABLET ORAL at 22:06

## 2021-12-05 RX ADMIN — Medication 1 TABLET(S): at 13:10

## 2021-12-05 RX ADMIN — ENOXAPARIN SODIUM 40 MILLIGRAM(S): 100 INJECTION SUBCUTANEOUS at 13:09

## 2021-12-05 RX ADMIN — CEFTRIAXONE 100 MILLIGRAM(S): 500 INJECTION, POWDER, FOR SOLUTION INTRAMUSCULAR; INTRAVENOUS at 22:05

## 2021-12-05 RX ADMIN — POLYETHYLENE GLYCOL 3350 17 GRAM(S): 17 POWDER, FOR SOLUTION ORAL at 13:09

## 2021-12-05 NOTE — PROGRESS NOTE ADULT - PROBLEM SELECTOR PLAN 1
meeting 3 SIRS criteria (febrile, tachycardic, tachypneic)  iso RVP+ SARSCOV2 however CXR patchy R lung opacities less typical, possibly iso bacterial PNA  lactate wnl  s/p CTX, azithro, 1L NS in ED  - CT chest Opacities throughout the right lung representing multifocal pneumonia likely corresponding to history of Covid, although other superimposed infectious etiologies cannot be excluded.  -c/w remdesivir (12/2-12/5) as pt is symptomatic w/ fever and lethargy  -c/w ceftriaxone  x7 days (11/30-12/6), probiotic  - Urine strep and legionella ag neg, dc'd zithromax, Ucx and Bcx (11/30) ngtd  - CTH no acute path  -DVT ppx with lovenox  -trend temp curve  -dispo: rehab pending repeat covid swab 12/9, 10 day quarantine in house, updated daughter on 12/3 who's agreeable with [facility]

## 2021-12-06 ENCOUNTER — TRANSCRIPTION ENCOUNTER (OUTPATIENT)
Age: 71
End: 2021-12-06

## 2021-12-06 LAB
ALBUMIN SERPL ELPH-MCNC: 3.4 G/DL — SIGNIFICANT CHANGE UP (ref 3.3–5)
ALBUMIN SERPL ELPH-MCNC: 3.4 G/DL — SIGNIFICANT CHANGE UP (ref 3.3–5)
ALP SERPL-CCNC: 63 U/L — SIGNIFICANT CHANGE UP (ref 40–120)
ALP SERPL-CCNC: 64 U/L — SIGNIFICANT CHANGE UP (ref 40–120)
ALT FLD-CCNC: 68 U/L — HIGH (ref 4–33)
ALT FLD-CCNC: 68 U/L — HIGH (ref 4–33)
ANION GAP SERPL CALC-SCNC: 9 MMOL/L — SIGNIFICANT CHANGE UP (ref 7–14)
AST SERPL-CCNC: 85 U/L — HIGH (ref 4–32)
AST SERPL-CCNC: 88 U/L — HIGH (ref 4–32)
BASOPHILS # BLD AUTO: 0.04 K/UL — SIGNIFICANT CHANGE UP (ref 0–0.2)
BASOPHILS NFR BLD AUTO: 0.8 % — SIGNIFICANT CHANGE UP (ref 0–2)
BILIRUB DIRECT SERPL-MCNC: 0.2 MG/DL — SIGNIFICANT CHANGE UP (ref 0–0.3)
BILIRUB INDIRECT FLD-MCNC: 0.5 MG/DL — SIGNIFICANT CHANGE UP (ref 0–1)
BILIRUB SERPL-MCNC: 0.6 MG/DL — SIGNIFICANT CHANGE UP (ref 0.2–1.2)
BILIRUB SERPL-MCNC: 0.7 MG/DL — SIGNIFICANT CHANGE UP (ref 0.2–1.2)
BUN SERPL-MCNC: 8 MG/DL — SIGNIFICANT CHANGE UP (ref 7–23)
CALCIUM SERPL-MCNC: 9.6 MG/DL — SIGNIFICANT CHANGE UP (ref 8.4–10.5)
CHLORIDE SERPL-SCNC: 102 MMOL/L — SIGNIFICANT CHANGE UP (ref 98–107)
CK SERPL-CCNC: 56 U/L — SIGNIFICANT CHANGE UP (ref 25–170)
CO2 SERPL-SCNC: 25 MMOL/L — SIGNIFICANT CHANGE UP (ref 22–31)
CREAT SERPL-MCNC: 0.67 MG/DL — SIGNIFICANT CHANGE UP (ref 0.5–1.3)
CREAT SERPL-MCNC: 0.71 MG/DL — SIGNIFICANT CHANGE UP (ref 0.5–1.3)
D DIMER BLD IA.RAPID-MCNC: 390 NG/ML DDU — HIGH
EOSINOPHIL # BLD AUTO: 0.06 K/UL — SIGNIFICANT CHANGE UP (ref 0–0.5)
EOSINOPHIL NFR BLD AUTO: 1.3 % — SIGNIFICANT CHANGE UP (ref 0–6)
FERRITIN SERPL-MCNC: 1140 NG/ML — HIGH (ref 15–150)
GLUCOSE SERPL-MCNC: 73 MG/DL — SIGNIFICANT CHANGE UP (ref 70–99)
HCT VFR BLD CALC: 39.6 % — SIGNIFICANT CHANGE UP (ref 34.5–45)
HGB BLD-MCNC: 12.9 G/DL — SIGNIFICANT CHANGE UP (ref 11.5–15.5)
IANC: 2.15 K/UL — SIGNIFICANT CHANGE UP (ref 1.5–8.5)
IMM GRANULOCYTES NFR BLD AUTO: 1.7 % — HIGH (ref 0–1.5)
LDH SERPL L TO P-CCNC: 330 U/L — HIGH (ref 135–225)
LYMPHOCYTES # BLD AUTO: 1.81 K/UL — SIGNIFICANT CHANGE UP (ref 1–3.3)
LYMPHOCYTES # BLD AUTO: 37.9 % — SIGNIFICANT CHANGE UP (ref 13–44)
MAGNESIUM SERPL-MCNC: 2.3 MG/DL — SIGNIFICANT CHANGE UP (ref 1.6–2.6)
MCHC RBC-ENTMCNC: 30.5 PG — SIGNIFICANT CHANGE UP (ref 27–34)
MCHC RBC-ENTMCNC: 32.6 GM/DL — SIGNIFICANT CHANGE UP (ref 32–36)
MCV RBC AUTO: 93.6 FL — SIGNIFICANT CHANGE UP (ref 80–100)
MONOCYTES # BLD AUTO: 0.64 K/UL — SIGNIFICANT CHANGE UP (ref 0–0.9)
MONOCYTES NFR BLD AUTO: 13.4 % — SIGNIFICANT CHANGE UP (ref 2–14)
NEUTROPHILS # BLD AUTO: 2.15 K/UL — SIGNIFICANT CHANGE UP (ref 1.8–7.4)
NEUTROPHILS NFR BLD AUTO: 44.9 % — SIGNIFICANT CHANGE UP (ref 43–77)
NRBC # BLD: 0 /100 WBCS — SIGNIFICANT CHANGE UP
NRBC # FLD: 0 K/UL — SIGNIFICANT CHANGE UP
PHOSPHATE SERPL-MCNC: 2.8 MG/DL — SIGNIFICANT CHANGE UP (ref 2.5–4.5)
PLATELET # BLD AUTO: 463 K/UL — HIGH (ref 150–400)
POTASSIUM SERPL-MCNC: 4.9 MMOL/L — SIGNIFICANT CHANGE UP (ref 3.5–5.3)
POTASSIUM SERPL-SCNC: 4.9 MMOL/L — SIGNIFICANT CHANGE UP (ref 3.5–5.3)
PROCALCITONIN SERPL-MCNC: 0.09 NG/ML — SIGNIFICANT CHANGE UP (ref 0.02–0.1)
PROT SERPL-MCNC: 7.2 G/DL — SIGNIFICANT CHANGE UP (ref 6–8.3)
PROT SERPL-MCNC: 7.2 G/DL — SIGNIFICANT CHANGE UP (ref 6–8.3)
RBC # BLD: 4.23 M/UL — SIGNIFICANT CHANGE UP (ref 3.8–5.2)
RBC # FLD: 12.9 % — SIGNIFICANT CHANGE UP (ref 10.3–14.5)
SODIUM SERPL-SCNC: 136 MMOL/L — SIGNIFICANT CHANGE UP (ref 135–145)
WBC # BLD: 4.78 K/UL — SIGNIFICANT CHANGE UP (ref 3.8–10.5)
WBC # FLD AUTO: 4.78 K/UL — SIGNIFICANT CHANGE UP (ref 3.8–10.5)

## 2021-12-06 PROCEDURE — 99232 SBSQ HOSP IP/OBS MODERATE 35: CPT

## 2021-12-06 RX ADMIN — Medication 1 TABLET(S): at 17:32

## 2021-12-06 RX ADMIN — POLYETHYLENE GLYCOL 3350 17 GRAM(S): 17 POWDER, FOR SOLUTION ORAL at 17:31

## 2021-12-06 RX ADMIN — Medication 1 TABLET(S): at 06:15

## 2021-12-06 RX ADMIN — ENOXAPARIN SODIUM 40 MILLIGRAM(S): 100 INJECTION SUBCUTANEOUS at 17:32

## 2021-12-06 RX ADMIN — SENNA PLUS 2 TABLET(S): 8.6 TABLET ORAL at 22:04

## 2021-12-06 RX ADMIN — Medication 100 MILLIGRAM(S): at 17:32

## 2021-12-06 NOTE — DISCHARGE NOTE PROVIDER - PROVIDER TOKENS
FREE:[LAST:[1 week PCP fu],PHONE:[(   )    -],FAX:[(   )    -],ESTABLISHEDPATIENT:[T]] FREE:[LAST:[[place]],FIRST:[Michela],PHONE:[phone]:[(   )    -],ADDRESS:[[address] [place] [address]]]

## 2021-12-06 NOTE — DISCHARGE NOTE PROVIDER - NSDCMRMEDTOKEN_GEN_ALL_CORE_FT
aspirin 81 mg oral tablet, chewable: 1 tab(s) chewed once a day   senna oral tablet: 1 tab(s) orally 2 times a day    aspirin 81 mg oral tablet, chewable: 1 tab(s) chewed once a day   polyethylene glycol 3350 oral powder for reconstitution: 17 gram(s) orally once a day  senna oral tablet: 1 tab(s) orally 2 times a day

## 2021-12-06 NOTE — PROGRESS NOTE ADULT - PROBLEM SELECTOR PLAN 2
RVP+ SARSCOV2, non-vaccinated  -currently on RA  -trend inflammatory markers crp, ferritin, d-dimer q72h  -PT recs rehab, will need 10 day isolation and repeat covid swab on 12/9  -isolation precautions  -CT chest as above  -completed remdesivir (12/2-12/5)

## 2021-12-06 NOTE — DISCHARGE NOTE PROVIDER - CARE PROVIDER_API CALL
1 week PCP fu,   Phone: (   )    -  Fax: (   )    -  Established Patient  Follow Up Time:    Michela East  [address] [place] [address]  Phone: [phone]  Fax: (   )    -  Follow Up Time:

## 2021-12-06 NOTE — PROGRESS NOTE ADULT - PROBLEM SELECTOR PLAN 7
1. Name of PCP: Michela East [phone]  2. PCP Contacted on Admission [ ] Y [x] N [ ] N/A  3. PCP Contacted on Discharge [ ] Y [ ] N [ ] N/A  4. Post discharge appointment Date and Location:  5. Summary of Handoff Given to PCP [ ] Y [ ] N

## 2021-12-06 NOTE — DISCHARGE NOTE PROVIDER - NSDCCPCAREPLAN_GEN_ALL_CORE_FT
PRINCIPAL DISCHARGE DIAGNOSIS  Diagnosis: AMS (altered mental status)  Assessment and Plan of Treatment: You came in altered mental status to the hospitlaL  You were found to have COVID-19.  CT of  the chest showed opacities thoughout the right lung representing multifocal pneumonia.  CT of the head shows No mass effect, hemorrhage or other evidence of acute intracranial pathology.  Follow up with your PCP within 1 week.         SECONDARY DISCHARGE DIAGNOSES  Diagnosis: Pneumonia due to COVID-19 virus  Assessment and Plan of Treatment:   You have been diagnosed with the COVID-19 virus during your hospital stay. You must self quarantine to complete a 10 day time period.  Monitor for fevers, shortness of breath and cough primarily.  Monitor your temperature daily to not any changes and increases.    It has been determined that you no longer need hospitalization and can recover while remaining in self-quarantine at home. You should follow the prevention steps below until a healthcare provider or local or state health department says you can return to your normal activities.  1. You should restrict activities outside your home, except for getting medical care.  2. Do not go to work, school, or public areas.  3. Avoid using public transportation, ride-sharing, or taxis.  4. Separate yourself from other people and animals in your home.  5. Call ahead before visiting your doctor.  6. Wear a facemask.  7. Cover your coughs and sneezes.  8. Clean your hands often.  9. Avoid sharing personal household items.  10. Clean all “high-touch” surfaces everyday.  11. Monitor your symptoms.  If you have a medical emergency and need to call 911, notify the dispatch personnel that you have COVID-19 If possible, put on a facemask before emergency medical services arrive.  12. Stopping home isolation.   Patients with confirmed COVID-19 should remain under home isolation precautions for 10 days since the positive COVID-19 test and until the risk of secondary transmission to others is thought to be low. The decision to discontinue home isolation precautions should be made on a case-by-case basis, in consultation with the primary health care provide. You can call the [facility] [phone] or [place] Department of Health at [phone] for further information about COVID-19.  followup with your PCP within 1 week.       Diagnosis: Constipation  Assessment and Plan of Treatment: senna and mirralax for constipation   Follow up with your PCP within 1 week.     PRINCIPAL DISCHARGE DIAGNOSIS  Diagnosis: AMS (altered mental status)  Assessment and Plan of Treatment: You came in altered mental status to the hospitlaL  You were found to have COVID-19.  CT of  the chest showed opacities thoughout the right lung representing multifocal pneumonia.  CT of the head shows No mass effect, hemorrhage or other evidence of acute intracranial pathology.  You have been prescribed aspirin 81mg daily for 30 days to reduce risk of blood clots after you are discharged.  Follow up with your PCP within 1 week.         SECONDARY DISCHARGE DIAGNOSES  Diagnosis: Pneumonia due to COVID-19 virus  Assessment and Plan of Treatment:   You have been diagnosed with the COVID-19 virus during your hospital stay. You must self quarantine to complete a 10 day time period.  Monitor for fevers, shortness of breath and cough primarily.  Monitor your temperature daily to not any changes and increases.    It has been determined that you no longer need hospitalization and can recover while remaining in self-quarantine at home. You should follow the prevention steps below until a healthcare provider or local or [facility] health department says you can return to your normal activities.  1. You should restrict activities outside your home, except for getting medical care.  2. Do not go to work, school, or public areas.  3. Avoid using public transportation, ride-sharing, or taxis.  4. Separate yourself from other people and animals in your home.  5. Call ahead before visiting your doctor.  6. Wear a facemask.  7. Cover your coughs and sneezes.  8. Clean your hands often.  9. Avoid sharing personal household items.  10. Clean all “high-touch” surfaces everyday.  11. Monitor your symptoms.  If you have a medical emergency and need to call 911, notify the dispatch personnel that you have COVID-19 If possible, put on a facemask before emergency medical services arrive.  12. Stopping home isolation.   Patients with confirmed COVID-19 should remain under home isolation precautions for 10 days since the positive COVID-19 test and until the risk of secondary transmission to others is thought to be low. The decision to discontinue home isolation precautions should be made on a case-by-case basis, in consultation with the primary health care provide. You can call the [facility] [phone] or [place] Department of Health at [phone] for further information about COVID-19.  followup with your PCP within 1 week.       Diagnosis: Constipation  Assessment and Plan of Treatment: senna and mirralax for constipation   Follow up with your PCP within 1 week.

## 2021-12-06 NOTE — PROGRESS NOTE ADULT - PROBLEM SELECTOR PLAN 1
met SIRS criteria on admission  - CT chest Opacities throughout the right lung representing multifocal pneumonia likely corresponding to history of Covid, although other superimposed infectious etiologies cannot be excluded.  - c/w ceftriaxone  x7 days (11/30-12/6)  - Ucx and Bcx (11/30) ngtd  - CTH no acute path  - dispo: rehab pending repeat covid swab 12/9, 10 day quarantine inpatient, patient's family agreeable with KIANA

## 2021-12-06 NOTE — DISCHARGE NOTE PROVIDER - HOSPITAL COURSE
71F PMH dementia (ambulatory, largely independent at baseline) p/w AMS, found to meet SIRS criteria (febrile, tachycardic, tachypneic) 2/2 COVID.    Sepsis due to pneumonia.   -met SIRS criteria on admission  - CT chest Opacities throughout the right lung representing multifocal pneumonia likely corresponding to history of Covid, although other superimposed infectious etiologies cannot be excluded.  - c/w ceftriaxone  x7 days (11/30-12/6)  - Ucx and Bcx (11/30) ngtd  - CTH no acute path  - dispo: rehab pending repeat covid swab 12/9, 10 day quarantine inpatient, patient's family agreeable with KIANA.      Pneumonia due to COVID-19 virus.   -RVP+ SARSCOV2, non-vaccinated  -currently on RA  -trend inflammatory markers crp, ferritin, d-dimer q72h  -PT recs rehab, will need 10 day isolation and repeat covid swab on 12/9  -isolation precautions  -CT chest as above  -completed remdesivir (12/2-12/5).    Metabolic encephalopathy.   ·in s/o covid encephalopathy superimposed on baseline dementia, decreased conversation, requiring further assistance w/ ambulation, bathing, feeding  likely iso acute infxn  -CT head no acute path  -c/w thiamine 100mg qd with poor intake  -SLP eval recs puree diet with thin liquids  - monitor mental status.      Dementia.   · Ambulatory, largely independent w/ ADLs at baseline   PT recs rehab, daughter in agreement.    Problem/Plan - 5:  ·  Problem: Constipation.   ·  Plan: W/o BM x 2 wks per daughter and unclear flatus  Abd exam w/o abn  - monitor for BMs  - c/w senna, miralax, titrate per BM.    Problem/Plan - 6:  ·  Problem: Need for prophylactic measure.   ·  Plan: -puree diet per SLP eval  -Fall precaution  - aspiration precaution  - PT c/s recs rehab.    On ___ this case was reviewed with  ____, the patient is medically stable and optimized for discharge. All medications were reviewed and prescriptions were sent to mutually agreed upon pharmacy.   71F PMH dementia (ambulatory, largely independent at baseline) p/w AMS, found to meet SIRS criteria (febrile, tachycardic, tachypneic) 2/2 COVID.    Sepsis due to pneumonia.   -met SIRS criteria on admission  - CT chest Opacities throughout the right lung representing multifocal pneumonia likely corresponding to history of Covid, although other superimposed infectious etiologies cannot be excluded.  - c/w ceftriaxone  x7 days (11/30-12/6)  - Ucx and Bcx (11/30) ngtd  - CTH no acute path  - dispo: rehab however patient's family wants to take the patient home.      Pneumonia due to COVID-19 virus.   -RVP+ SARSCOV2, non-vaccinated  -currently on RA  -trend inflammatory markers crp, ferritin, d-dimer q72h  -PT recs rehab, will need 10 day isolation and repeat covid swab on 12/9  -isolation precautions  -CT chest as above  -completed remdesivir (12/2-12/5).    Metabolic encephalopathy.   ·in s/o covid encephalopathy superimposed on baseline dementia, decreased conversation, requiring further assistance w/ ambulation, bathing, feeding  likely iso acute infxn  -CT head no acute path  -thiamine 100mg qd with poor intake  -SLP eval recs puree diet with thin liquids  - monitor mental status.      Dementia.   · Ambulatory, largely independent w/ ADLs at baseline   PT recs rehab, daughter in agreement.      Constipation.   W/o BM x 2 wks per daughter and unclear flatus  Abd exam w/o abn  - monitor for BMs  - c/w senna, miralax, titrate per BM.        On 12/08/21 this case was reviewed with Dr. Echavarria, the patient is medically stable and optimized for discharge. All medications were reviewed and prescriptions were sent to mutually agreed upon pharmacy.

## 2021-12-07 DIAGNOSIS — R33.9 RETENTION OF URINE, UNSPECIFIED: ICD-10-CM

## 2021-12-07 LAB
ALBUMIN SERPL ELPH-MCNC: 3.3 G/DL — SIGNIFICANT CHANGE UP (ref 3.3–5)
ALP SERPL-CCNC: 63 U/L — SIGNIFICANT CHANGE UP (ref 40–120)
ALT FLD-CCNC: 55 U/L — HIGH (ref 4–33)
ANION GAP SERPL CALC-SCNC: 10 MMOL/L — SIGNIFICANT CHANGE UP (ref 7–14)
AST SERPL-CCNC: 54 U/L — HIGH (ref 4–32)
BILIRUB DIRECT SERPL-MCNC: <0.2 MG/DL — SIGNIFICANT CHANGE UP (ref 0–0.3)
BILIRUB INDIRECT FLD-MCNC: >0.4 MG/DL — SIGNIFICANT CHANGE UP (ref 0–1)
BILIRUB SERPL-MCNC: 0.6 MG/DL — SIGNIFICANT CHANGE UP (ref 0.2–1.2)
BUN SERPL-MCNC: 11 MG/DL — SIGNIFICANT CHANGE UP (ref 7–23)
CALCIUM SERPL-MCNC: 9.5 MG/DL — SIGNIFICANT CHANGE UP (ref 8.4–10.5)
CHLORIDE SERPL-SCNC: 101 MMOL/L — SIGNIFICANT CHANGE UP (ref 98–107)
CO2 SERPL-SCNC: 24 MMOL/L — SIGNIFICANT CHANGE UP (ref 22–31)
CREAT SERPL-MCNC: 0.73 MG/DL — SIGNIFICANT CHANGE UP (ref 0.5–1.3)
CREAT SERPL-MCNC: 0.74 MG/DL — SIGNIFICANT CHANGE UP (ref 0.5–1.3)
GLUCOSE SERPL-MCNC: 85 MG/DL — SIGNIFICANT CHANGE UP (ref 70–99)
HCT VFR BLD CALC: 37.3 % — SIGNIFICANT CHANGE UP (ref 34.5–45)
HGB BLD-MCNC: 12.3 G/DL — SIGNIFICANT CHANGE UP (ref 11.5–15.5)
MAGNESIUM SERPL-MCNC: 2.4 MG/DL — SIGNIFICANT CHANGE UP (ref 1.6–2.6)
MCHC RBC-ENTMCNC: 30.8 PG — SIGNIFICANT CHANGE UP (ref 27–34)
MCHC RBC-ENTMCNC: 33 GM/DL — SIGNIFICANT CHANGE UP (ref 32–36)
MCV RBC AUTO: 93.5 FL — SIGNIFICANT CHANGE UP (ref 80–100)
NRBC # BLD: 0 /100 WBCS — SIGNIFICANT CHANGE UP
NRBC # FLD: 0 K/UL — SIGNIFICANT CHANGE UP
PHOSPHATE SERPL-MCNC: 3.3 MG/DL — SIGNIFICANT CHANGE UP (ref 2.5–4.5)
PLATELET # BLD AUTO: 412 K/UL — HIGH (ref 150–400)
POTASSIUM SERPL-MCNC: 4.8 MMOL/L — SIGNIFICANT CHANGE UP (ref 3.5–5.3)
POTASSIUM SERPL-SCNC: 4.8 MMOL/L — SIGNIFICANT CHANGE UP (ref 3.5–5.3)
PROT SERPL-MCNC: 6.8 G/DL — SIGNIFICANT CHANGE UP (ref 6–8.3)
RBC # BLD: 3.99 M/UL — SIGNIFICANT CHANGE UP (ref 3.8–5.2)
RBC # FLD: 12.9 % — SIGNIFICANT CHANGE UP (ref 10.3–14.5)
SODIUM SERPL-SCNC: 135 MMOL/L — SIGNIFICANT CHANGE UP (ref 135–145)
WBC # BLD: 5.3 K/UL — SIGNIFICANT CHANGE UP (ref 3.8–10.5)
WBC # FLD AUTO: 5.3 K/UL — SIGNIFICANT CHANGE UP (ref 3.8–10.5)

## 2021-12-07 PROCEDURE — 99232 SBSQ HOSP IP/OBS MODERATE 35: CPT

## 2021-12-07 RX ADMIN — POLYETHYLENE GLYCOL 3350 17 GRAM(S): 17 POWDER, FOR SOLUTION ORAL at 17:03

## 2021-12-07 RX ADMIN — ENOXAPARIN SODIUM 40 MILLIGRAM(S): 100 INJECTION SUBCUTANEOUS at 17:03

## 2021-12-07 RX ADMIN — Medication 100 MILLIGRAM(S): at 17:03

## 2021-12-07 RX ADMIN — Medication 1 TABLET(S): at 17:03

## 2021-12-07 RX ADMIN — Medication 1 TABLET(S): at 05:07

## 2021-12-07 NOTE — PROGRESS NOTE ADULT - PROBLEM SELECTOR PLAN 2
RVP+ SARSCOV2, non-vaccinated  -currently on RA  -trend inflammatory markers crp, ferritin, d-dimer q72h  -PT recs rehab, patient's family now want to take her home instead  -isolation precautions  -CT chest as above  -completed remdesivir (12/2-12/5)

## 2021-12-07 NOTE — PROGRESS NOTE ADULT - PROBLEM SELECTOR PLAN 7
-puree diet per SLP eval  -Fall precaution  - aspiration precaution  - PT c/s recs rehab, patient's family now want to take patient home

## 2021-12-07 NOTE — PROGRESS NOTE ADULT - PROBLEM SELECTOR PLAN 1
met SIRS criteria on admission  - CT chest with opacities throughout the right lung representing multifocal pneumonia likely corresponding to history of Covid, although other superimposed infectious etiologies cannot be excluded.  - completed ceftriaxone x7 days (11/30-12/6)  - Ucx and Bcx (11/30) ngtd  - CTH no acute path  - dispo: patient's family now want to take patient home instead of rehab

## 2021-12-08 ENCOUNTER — TRANSCRIPTION ENCOUNTER (OUTPATIENT)
Age: 71
End: 2021-12-08

## 2021-12-08 VITALS
HEART RATE: 75 BPM | OXYGEN SATURATION: 100 % | RESPIRATION RATE: 18 BRPM | TEMPERATURE: 99 F | SYSTOLIC BLOOD PRESSURE: 104 MMHG | DIASTOLIC BLOOD PRESSURE: 67 MMHG

## 2021-12-08 DIAGNOSIS — R50.9 FEVER, UNSPECIFIED: ICD-10-CM

## 2021-12-08 LAB
ANION GAP SERPL CALC-SCNC: 7 MMOL/L — SIGNIFICANT CHANGE UP (ref 7–14)
BUN SERPL-MCNC: 11 MG/DL — SIGNIFICANT CHANGE UP (ref 7–23)
CALCIUM SERPL-MCNC: 9.1 MG/DL — SIGNIFICANT CHANGE UP (ref 8.4–10.5)
CHLORIDE SERPL-SCNC: 105 MMOL/L — SIGNIFICANT CHANGE UP (ref 98–107)
CO2 SERPL-SCNC: 24 MMOL/L — SIGNIFICANT CHANGE UP (ref 22–31)
CREAT SERPL-MCNC: 0.8 MG/DL — SIGNIFICANT CHANGE UP (ref 0.5–1.3)
GLUCOSE SERPL-MCNC: 119 MG/DL — HIGH (ref 70–99)
HCT VFR BLD CALC: 37 % — SIGNIFICANT CHANGE UP (ref 34.5–45)
HGB BLD-MCNC: 12.6 G/DL — SIGNIFICANT CHANGE UP (ref 11.5–15.5)
MAGNESIUM SERPL-MCNC: 2.4 MG/DL — SIGNIFICANT CHANGE UP (ref 1.6–2.6)
MCHC RBC-ENTMCNC: 31.5 PG — SIGNIFICANT CHANGE UP (ref 27–34)
MCHC RBC-ENTMCNC: 34.1 GM/DL — SIGNIFICANT CHANGE UP (ref 32–36)
MCV RBC AUTO: 92.5 FL — SIGNIFICANT CHANGE UP (ref 80–100)
NRBC # BLD: 0 /100 WBCS — SIGNIFICANT CHANGE UP
NRBC # FLD: 0 K/UL — SIGNIFICANT CHANGE UP
PHOSPHATE SERPL-MCNC: 3.1 MG/DL — SIGNIFICANT CHANGE UP (ref 2.5–4.5)
PLATELET # BLD AUTO: 444 K/UL — HIGH (ref 150–400)
POTASSIUM SERPL-MCNC: 4.7 MMOL/L — SIGNIFICANT CHANGE UP (ref 3.5–5.3)
POTASSIUM SERPL-SCNC: 4.7 MMOL/L — SIGNIFICANT CHANGE UP (ref 3.5–5.3)
RBC # BLD: 4 M/UL — SIGNIFICANT CHANGE UP (ref 3.8–5.2)
RBC # FLD: 13 % — SIGNIFICANT CHANGE UP (ref 10.3–14.5)
SODIUM SERPL-SCNC: 136 MMOL/L — SIGNIFICANT CHANGE UP (ref 135–145)
WBC # BLD: 4.79 K/UL — SIGNIFICANT CHANGE UP (ref 3.8–10.5)
WBC # FLD AUTO: 4.79 K/UL — SIGNIFICANT CHANGE UP (ref 3.8–10.5)

## 2021-12-08 PROCEDURE — 99239 HOSP IP/OBS DSCHRG MGMT >30: CPT

## 2021-12-08 RX ORDER — ASPIRIN/CALCIUM CARB/MAGNESIUM 324 MG
1 TABLET ORAL
Qty: 30 | Refills: 0
Start: 2021-12-08 | End: 2022-01-06

## 2021-12-08 RX ORDER — SODIUM CHLORIDE 9 MG/ML
1000 INJECTION, SOLUTION INTRAVENOUS
Refills: 0 | Status: DISCONTINUED | OUTPATIENT
Start: 2021-12-08 | End: 2021-12-08

## 2021-12-08 RX ORDER — SENNA PLUS 8.6 MG/1
1 TABLET ORAL
Qty: 8 | Refills: 0
Start: 2021-12-08 | End: 2021-12-11

## 2021-12-08 RX ORDER — POLYETHYLENE GLYCOL 3350 17 G/17G
17 POWDER, FOR SOLUTION ORAL
Qty: 500 | Refills: 0
Start: 2021-12-08

## 2021-12-08 RX ADMIN — Medication 100 MILLIGRAM(S): at 11:09

## 2021-12-08 RX ADMIN — ENOXAPARIN SODIUM 40 MILLIGRAM(S): 100 INJECTION SUBCUTANEOUS at 11:09

## 2021-12-08 RX ADMIN — POLYETHYLENE GLYCOL 3350 17 GRAM(S): 17 POWDER, FOR SOLUTION ORAL at 11:09

## 2021-12-08 RX ADMIN — SODIUM CHLORIDE 75 MILLILITER(S): 9 INJECTION, SOLUTION INTRAVENOUS at 04:19

## 2021-12-08 RX ADMIN — Medication 1 TABLET(S): at 05:01

## 2021-12-08 RX ADMIN — Medication 1 TABLET(S): at 16:37

## 2021-12-08 NOTE — PROGRESS NOTE ADULT - PROBLEM SELECTOR PROBLEM 2
Pneumonia due to COVID-19 virus

## 2021-12-08 NOTE — DISCHARGE NOTE NURSING/CASE MANAGEMENT/SOCIAL WORK - NSDCPEFALRISK_GEN_ALL_CORE
For information on Fall & Injury Prevention, visit: [email or web address] OR  [email or web address] OR  https://www.cdc.gov/steadi/patient.html

## 2021-12-08 NOTE — CHART NOTE - NSCHARTNOTEFT_GEN_A_CORE
ACP team PA Note    pt with low UOP, last straight cath at 6 am on 12/7-- 400 cc, UOP x 1 during the day shift, bladder scan at 1 am -- 200 cc,   -- started on D5 0.45 NS at 75 cc/hr,   -- continue monitoring

## 2021-12-08 NOTE — PROGRESS NOTE ADULT - PROBLEM SELECTOR PLAN 7
-puree diet per SLP eval  -Fall precaution  - aspiration precaution  - PT c/s recs rehab, patient's family now want to take patient home  - D/c home today pending delivery of DME -puree diet per SLP eval  -Fall precaution  - aspiration precaution  - PT c/s recs rehab, patient's family now want to take patient home  - D/c home today pending delivery of DME  - Message left for patient's daughter Lashaun at [phone] on 12/8 regarding d/c plan

## 2021-12-08 NOTE — PROGRESS NOTE ADULT - PROBLEM SELECTOR PLAN 8
1. Name of PCP: Michela East [phone]  2. PCP Contacted on Admission [ ] Y [x] N [ ] N/A  3. PCP Contacted on Discharge [ ] Y [ ] N [ ] N/A  4. Post discharge appointment Date and Location:  5. Summary of Handoff Given to PCP [ ] Y [ ] N
1. Name of PCP: Michela East [phone]  2. PCP Contacted on Admission [ ] Y [x] N [ ] N/A  3. PCP Contacted on Discharge [ ] Y [ ] N [ ] N/A  4. Post discharge appointment Date and Location:  5. Summary of Handoff Given to PCP [ ] Y [ ] N
1. Name of PCP: Michela East [phone]  2. PCP Contacted on Admission [ ] Y [x] N [ ] N/A  3. PCP Contacted on Discharge [ ] Y [ ] N [ ] N/A  4. Post discharge appointment Date and Location:  5. Summary of Handoff Given to PCP [ ] Y [ ] N
1. Name of PCP: Michela East [phone]  2. PCP Contacted on Admission [ ] Y [x] N [ ] N/A  3. PCP Contacted on Discharge [ ] Y [ ] N [ ] N/A  4. Post discharge appointment Date and Location:  5. Summary of Handoff Given to PCP [ ] Y [ ] N
1. Name of PCP: Michela East [phone]  2. PCP Contacted on Admission [ ] Y [x] N [ ] N/A  3. PCP Contacted on Discharge [ ] Y [ ] N [ ] N/A  4. Post discharge appointment Date and Location:  5. Summary of Handoff Given to PCP [ ] Y [ ] N
1. Name of PCP: Michela East [phone]  2. PCP Contacted on Admission [ ] Y [x] N [ ] N/A  3. PCP Contacted on Discharge [ ] Y [ ] N [ ] N/A  4. Post discharge appointment Date and Location:  5. Summary of Handoff Given to PCP [ ] Y [ ] N
1. Name of PCP: Michela East [phone]  2. PCP Contacted on Admission [ ] Y [x] N [ ] N/A  3. PCP Contacted on Discharge [ ] Y [ ] N [ ] N/A  4. Post discharge appointment Date and Location:  5. Summary of Handoff Given to PCP [ ] Y [ ] N

## 2021-12-08 NOTE — PROGRESS NOTE ADULT - PROBLEM SELECTOR PLAN 5
Per daughter iso decreased po intake  SCr 0.98 unclear bl  - UA w/ mod bacteria, WBC 2, f/u Ucx  - monitor Is/Os  - BS q8h
Patient requiring 2 straight caths, on 12/3 and 12/7  -bladder scan protocol  -will need outpatient urology follow up
Per daughter iso decreased po intake  SCr 0.98 unclear bl  - UA w/ mod bacteria, WBC 2, f/u Ucx  - monitor Is/Os  - BS q8h
Per daughter iso decreased po intake  SCr 0.98 unclear bl  - UA w/ mod bacteria, WBC 2, f/u Ucx  - monitor Is/Os  - BS q8h
Patient requiring 2 straight caths, on 12/3 and 12/7  -bladder scan protocol  -will need outpatient urology follow up
W/o BM x 2 wks per daughter and unclear flatus  Abd exam w/o abn  - monitor for BMs  - c/w senna, miralax, titrate per BM

## 2021-12-08 NOTE — PROGRESS NOTE ADULT - ASSESSMENT
71F PMH dementia (ambulatory, largely independent at baseline) p/w AMS, found to meet SIRS criteria (febrile, tachycardic, tachypneic) 2/2 COVID.
71F PMH dementia (ambulatory, largely independent at bl) p/w AMS, found to meet SIRS criteria (febrile, tachycardic, tachypneic) 2/2 suspected pulmonary source on CXR iso +RVP SARSCOV2
71F PMH dementia (ambulatory, largely independent at baseline) p/w AMS, found to meet SIRS criteria (febrile, tachycardic, tachypneic) 2/2 COVID.
71F PMH dementia (ambulatory, largely independent at bl) p/w AMS, found to meet SIRS criteria (febrile, tachycardic, tachypneic) 2/2 suspected pulmonary source on CXR iso +RVP SARSCOV2
71F PMH dementia (ambulatory, largely independent at bl) p/w AMS, found to meet SIRS criteria (febrile, tachycardic, tachypneic) 2/2 suspected pulmonary source on CXR iso +RVP SARSCOV2
71F PMH dementia (ambulatory, largely independent at baseline) p/w AMS, found to meet SIRS criteria (febrile, tachycardic, tachypneic) 2/2 COVID.
71F PMH dementia (ambulatory, largely independent at bl) p/w AMS, found to meet SIRS criteria (febrile, tachycardic, tachypneic) 2/2 suspected pulmonary source on CXR iso +RVP SARSCOV2
71F PMH dementia (ambulatory, largely independent at bl) p/w AMS, found to meet SIRS criteria (febrile, tachycardic, tachypneic) 2/2 suspected pulmonary source on CXR iso +RVP SARSCOV2

## 2021-12-08 NOTE — PROGRESS NOTE ADULT - PROBLEM SELECTOR PLAN 4
Ambulatory, largely independent w/ ADLs at baseline
Ambulatory, largely independent w/ ADLs at bl  Obtain outpt collateral
Ambulatory, largely independent w/ ADLs at baseline   PT recs rehab, daughter in agreement
Ambulatory, largely independent w/ ADLs at baseline   PT recs rehab, daughter in agreement
Ambulatory, largely independent w/ ADLs at baseline
Ambulatory, largely independent w/ ADLs at bl  Obtain outpt collateral
Ambulatory, largely independent w/ ADLs at baseline   PT recs rehab  Obtain outpt collateral
Ambulatory, largely independent w/ ADLs at bl  Obtain outpt collateral

## 2021-12-08 NOTE — PROGRESS NOTE ADULT - PROBLEM SELECTOR PROBLEM 5
Decreased urine output
Urinary retention
Urinary retention
Decreased urine output
Constipation
Decreased urine output

## 2021-12-08 NOTE — PROGRESS NOTE ADULT - PROBLEM SELECTOR PLAN 2
DOS 1/11/2022 [facility]    Confirm with patient:pt  Laterality: left  PCP: Robbin  Ins: [facility]  Nickel Allergy: NKA  Case Request:  yes  Preop: yes  Postop:  yes  Surg in [place]: yes  Conf letter mailed on: 11/17/2021   Verbally confirmed no aspirin or anti-inflammatory meds or weight loss medication for 7 days prior to surgery, NPO instructions 8 hours prior/NPO after midnight night before surgery, preop with PCP 2-3 weeks prior to surgery, ride home if Out Patient and anesthesia: yes  Service to FP/IM/Cardiology: yes  COVID Appt scheduled yes  Prehab order placed no-pt is already attending PT  [facility]  n/a  Staff message RN pool: yes    Surgery scheduled / Covid order needed  Received: Today  Michael Cueva Nurse [facility]  Surgery scheduled 1/11/2022 @ [facility] - Left CHEN     PreSurgical COVID Order Needed for scheduled Lab Visit.     Date of Appt = 1/9/2022     Order Needed = [id number]   Order Class = ACL Location = LAB COLLECT     THANK YOU,   Edelmira      RVP+ SARSCOV2, non-vaccinated  -currently on RA  -trend inflammatory markers crp, ferritin, d-dimer q72h  -PT recs rehab, patient's family now want to take her home instead  -isolation precautions  -CT chest as above  -completed remdesivir (12/2-12/5)

## 2021-12-08 NOTE — PROGRESS NOTE ADULT - PROBLEM SELECTOR PLAN 3
in s/o covid encephalopathy superimposed on baseline dementia, decreased conversation, requiring further assistance w/ ambulation, bathing, feeding  likely iso acute infxn  -CT head no acute path  -c/w thiamine 100mg qd with poor intake  -SLP patricia sales puree diet with thin liquids  - monitor mental status
in s/o covid encephalopathy superimposed on baseline dementia, decreased conversation, requiring further assistance w/ ambulation, bathing, feeding  likely iso acute infxn  - CTH NC no acute path  -start thiamine 100mg qd with poor intake  -SLP patricia sales puree diet with thin liquids  - monitor mental status
in s/o covid encephalopathy superimposed on baseline dementia, decreased conversation, requiring further assistance w/ ambulation, bathing, feeding  likely iso acute infxn  - CT head no acute path  -c/w thiamine 100mg qd with poor intake  -SLP patricia sales puree diet with thin liquids  - monitor mental status
in s/o covid encephalopathy superimposed on baseline dementia, decreased conversation, requiring further assistance w/ ambulation, bathing, feeding  likely iso acute infxn  -CT head no acute path  -c/w thiamine 100mg qd with poor intake  -SLP patricia sales puree diet with thin liquids  - monitor mental status
in s/o covid encephalopathy superimposed on baseline dementia, decreased conversation, requiring further assistance w/ ambulation, bathing, feeding  likely iso acute infxn  -CT head no acute path  -c/w thiamine 100mg qd with poor intake  -SLP patricia sales puree diet with thin liquids  - monitor mental status
in s/o covid encephalopathy superimposed on baseline dementia, decreased conversation, requiring further assistance w/ ambulation, bathing, feeding  likely iso acute infxn  - CT head no acute path  -start thiamine 100mg qd with poor intake  -SLP patricia sales puree diet with thin liquids  - monitor mental status
in s/o covid encephalopathy superimposed on baseline dementia, decreased conversation, requiring further assistance w/ ambulation, bathing, feeding  likely iso acute infxn  - CT head no acute path  -c/w thiamine 100mg qd with poor intake  -SLP patircia sales puree diet with thin liquids  - monitor mental status
in s/o covid encephalopathy superimposed on baseline dementia, decreased conversation, requiring further assistance w/ ambulation, bathing, feeding  likely iso acute infxn  - CT head no acute path  -c/w thiamine 100mg qd with poor intake  -SLP patricia sales puree diet with thin liquids  - monitor mental status

## 2021-12-08 NOTE — PROGRESS NOTE ADULT - PROBLEM SELECTOR PLAN 6
W/o BM x 2 wks per daughter and unclear flatus  Abd exam w/o abn  - monitor for BMs  - start senna, miralax, titrate per BM
Abd exam w/o abn  - monitor for BMs  - c/w senna, miralax, titrate per BM
-puree diet per SLP eval  -Fall precaution  - aspiration precaution  - PT c/s recs rehab
W/o BM x 2 wks per daughter and unclear flatus  Abd exam w/o abn  - monitor for BMs  - start senna, miralax, titrate per BM
W/o BM x 2 wks per daughter and unclear flatus  Abd exam w/o abn  - monitor for BMs  - start senna, miralax, titrate per BM
Abd exam w/o abn  - monitor for BMs  - c/w senna, miralax, titrate per BM

## 2021-12-08 NOTE — PROGRESS NOTE ADULT - PROBLEM SELECTOR PROBLEM 7
Discharge planning issues
Need for prophylactic measure

## 2021-12-08 NOTE — PROGRESS NOTE ADULT - SUBJECTIVE AND OBJECTIVE BOX
Dr. Maite Espino  Pager [phone]    PROGRESS NOTE:     Patient is a 71y old  Female who presents with a chief complaint of AMS (01 Dec 2021 14:51)      SUBJECTIVE / OVERNIGHT EVENTS: pt denies chest pain or sob   ADDITIONAL REVIEW OF SYSTEMS: demented and confused    MEDICATIONS  (STANDING):  azithromycin  IVPB 500 milliGRAM(s) IV Intermittent every 24 hours  cefTRIAXone   IVPB 1000 milliGRAM(s) IV Intermittent every 24 hours  enoxaparin Injectable 40 milliGRAM(s) SubCutaneous daily  lactobacillus acidophilus 1 Tablet(s) Oral two times a day  polyethylene glycol 3350 17 Gram(s) Oral daily  remdesivir  IVPB 100 milliGRAM(s) IV Intermittent every 24 hours  remdesivir  IVPB   IV Intermittent   senna 2 Tablet(s) Oral at bedtime  thiamine 100 milliGRAM(s) Oral daily    MEDICATIONS  (PRN):  acetaminophen     Tablet .. 650 milliGRAM(s) Oral every 6 hours PRN Temp greater or equal to 38C (100.4F)      CAPILLARY BLOOD GLUCOSE        I&O's Summary    01 Dec 2021 07:01  -  02 Dec 2021 07:00  --------------------------------------------------------  IN: 100 mL / OUT: 0 mL / NET: 100 mL    02 Dec 2021 07:01  -  02 Dec 2021 15:22  --------------------------------------------------------  IN: 0 mL / OUT: 500 mL / NET: -500 mL        PHYSICAL EXAM:  Vital Signs Last 24 Hrs  T(C): 36.6 (02 Dec 2021 10:45), Max: 37.1 (01 Dec 2021 22:10)  T(F): 97.9 (02 Dec 2021 10:45), Max: 98.8 (01 Dec 2021 22:10)  HR: 77 (02 Dec 2021 10:45) (60 - 87)  BP: 107/75 (02 Dec 2021 10:45) (107/75 - 133/90)  BP(mean): --  RR: 17 (02 Dec 2021 10:45) (16 - 17)  SpO2: 100% (02 Dec 2021 10:45) (100% - 100%)  CONSTITUTIONAL: NAD, frail   RESPIRATORY: Normal WBC, lungs mainly clear  CARDIOVASCULAR: Regular rate and rhythm, normal S1 and S2, no murmur/rub/gallop; No lower extremity edema; Peripheral pulses are 2+ bilaterally  ABDOMEN: Nontender to palpation, normoactive bowel sounds, no rebound/guarding;   MUSCULOSKELETAL: no clubbing or cyanosis of digits; no joint swelling or tenderness to palpation  PSYCH: A+O x1, demented    LABS:                        11.1   4.08  )-----------( 293      ( 02 Dec 2021 07:40 )             34.1     12-    132<L>  |  102  |  12  ----------------------------<  81  4.5   |  18<L>  |  0.66    Ca    8.7      02 Dec 2021 07:40  Phos  2.7     12-  Mg     2.20     12-02    TPro  6.7  /  Alb  3.0<L>  /  TBili  0.7  /  DBili  x   /  AST  29  /  ALT  26  /  AlkPhos  49  12-02    PT/INR - ( 01 Dec 2021 05:14 )   PT: 12.1 sec;   INR: 1.05 ratio           CARDIAC MARKERS ( 02 Dec 2021 07:40 )  x     / x     / 195 U/L / x     / x          Urinalysis Basic - ( 2021 21:33 )    Color: Blanca / Appearance: Clear / S.028 / pH: x  Gluc: x / Ketone: Negative  / Bili: Small / Urobili: 12 mg/dL   Blood: x / Protein: 30 mg/dL / Nitrite: Negative   Leuk Esterase: Negative / RBC: 2 /HPF / WBC 2 /HPF   Sq Epi: x / Non Sq Epi: 1 /HPF / Bacteria: Moderate        Culture - Blood (collected 2021 22:57)  Source: .Blood Blood-Peripheral  Preliminary Report (01 Dec 2021 23:02):    No growth to date.    Culture - Blood (collected 2021 22:55)  Source: .Blood Blood-Peripheral  Preliminary Report (01 Dec 2021 23:02):    No growth to date.    Culture - Urine (collected 2021 21:02)  Source: Clean Catch Clean Catch (Midstream)  Final Report (01 Dec 2021 22:35):    No growth        RADIOLOGY & ADDITIONAL TESTS:  Results Reviewed:   Imaging Personally Reviewed:  Electrocardiogram Personally Reviewed:    COORDINATION OF CARE:  Care Discussed with Consultants/Other Providers [Y/N]: jeffrey Pro, repeat swab   Prior or Outpatient Records Reviewed [Y/N]:  
Chanel Echavarria  [facility]  Pager #[phone]    Patient is a 71y old  Female who presents with a chief complaint of AMS (05 Dec 2021 12:10)      SUBJECTIVE / OVERNIGHT EVENTS: Patient seen and examined at bedside. Patient pleasant, says she feels well, no acute complaints. Denies SOB.    ADDITIONAL REVIEW OF SYSTEMS:    MEDICATIONS  (STANDING):  enoxaparin Injectable 40 milliGRAM(s) SubCutaneous daily  lactobacillus acidophilus 1 Tablet(s) Oral two times a day  polyethylene glycol 3350 17 Gram(s) Oral daily  senna 2 Tablet(s) Oral at bedtime  sodium chloride 0.9%. 1000 milliLiter(s) (65 mL/Hr) IV Continuous <Continuous>  thiamine 100 milliGRAM(s) Oral daily    MEDICATIONS  (PRN):  acetaminophen     Tablet .. 650 milliGRAM(s) Oral every 6 hours PRN Temp greater or equal to 38C (100.4F)      CAPILLARY BLOOD GLUCOSE        I&O's Summary    05 Dec 2021 07:01  -  06 Dec 2021 07:00  --------------------------------------------------------  IN: 200 mL / OUT: 0 mL / NET: 200 mL        PHYSICAL EXAM:    Vital Signs Last 24 Hrs  T(C): 36.4 (06 Dec 2021 11:40), Max: 36.4 (06 Dec 2021 06:07)  T(F): 97.6 (06 Dec 2021 11:40), Max: 97.6 (06 Dec 2021 11:40)  HR: 69 (06 Dec 2021 11:40) (68 - 85)  BP: 120/75 (06 Dec 2021 11:40) (112/69 - 120/75)  BP(mean): --  RR: 16 (06 Dec 2021 11:40) (16 - 16)  SpO2: 100% (06 Dec 2021 11:40) (98% - 100%)    CONSTITUTIONAL: NAD, elderly female  EYES: Conjunctiva and sclera clear  ENMT: Moist oral mucosa  RESPIRATORY: Normal respiratory effort; lungs are clear to auscultation bilaterally  CARDIOVASCULAR: Regular rate and rhythm, normal S1 and S2, no murmur/rub/gallop; No lower extremity edema  ABDOMEN: Nontender to palpation, normoactive bowel sounds  PSYCH: A+O to person, place  NEUROLOGY: No focal deficits  SKIN: No rashes; no palpable lesions    LABS:                        12.9   4.78  )-----------( 463      ( 06 Dec 2021 08:32 )             39.6     12-06    136  |  102  |  8   ----------------------------<  73  4.9   |  25  |  0.71    Ca    9.6      06 Dec 2021 08:32  Phos  2.8     12-06  Mg     2.30     12-06    TPro  7.2  /  Alb  3.4  /  TBili  0.6  /  DBili  0.2  /  AST  85<H>  /  ALT  68<H>  /  AlkPhos  63  12-06      CARDIAC MARKERS ( 06 Dec 2021 08:32 )  x     / x     / 56 U/L / x     / x        RADIOLOGY & ADDITIONAL TESTS: No new imaging  Results Reviewed: Yes  Imaging Personally Reviewed:  Electrocardiogram Personally Reviewed:    COORDINATION OF CARE:  Care Discussed with Consultants/Other Providers [Y/N]:  Prior or Outpatient Records Reviewed [Y/N]:  
Chanel Lee  [facility]  Pager #[phone]    Patient is a 71y old  Female who presents with a chief complaint of AMS (07 Dec 2021 13:31)      SUBJECTIVE / OVERNIGHT EVENTS: Patient seen and examined at bedside. Patient denies having any pain or SOB.    ADDITIONAL REVIEW OF SYSTEMS:    MEDICATIONS  (STANDING):  dextrose 5% + sodium chloride 0.45%. 1000 milliLiter(s) (75 mL/Hr) IV Continuous <Continuous>  enoxaparin Injectable 40 milliGRAM(s) SubCutaneous daily  lactobacillus acidophilus 1 Tablet(s) Oral two times a day  polyethylene glycol 3350 17 Gram(s) Oral daily  senna 2 Tablet(s) Oral at bedtime  sodium chloride 0.9%. 1000 milliLiter(s) (65 mL/Hr) IV Continuous <Continuous>  thiamine 100 milliGRAM(s) Oral daily    MEDICATIONS  (PRN):  acetaminophen     Tablet .. 650 milliGRAM(s) Oral every 6 hours PRN Temp greater or equal to 38C (100.4F)      CAPILLARY BLOOD GLUCOSE        I&O's Summary    07 Dec 2021 07:01  -  08 Dec 2021 07:00  --------------------------------------------------------  IN: 0 mL / OUT: 0 mL / NET: 0 mL    08 Dec 2021 07:01  -  08 Dec 2021 12:02  --------------------------------------------------------  IN: 75 mL / OUT: 0 mL / NET: 75 mL        PHYSICAL EXAM:    Vital Signs Last 24 Hrs  T(C): 37 (08 Dec 2021 11:37), Max: 37 (08 Dec 2021 11:37)  T(F): 98.6 (08 Dec 2021 11:37), Max: 98.6 (08 Dec 2021 11:37)  HR: 75 (08 Dec 2021 11:37) (68 - 88)  BP: 104/67 (08 Dec 2021 11:37) (100/60 - 105/72)  BP(mean): --  RR: 18 (08 Dec 2021 11:37) (16 - 18)  SpO2: 100% (08 Dec 2021 11:37) (99% - 100%)    CONSTITUTIONAL: NAD, elderly female  EYES: Conjunctiva and sclera clear  ENMT: Moist oral mucosa  RESPIRATORY: Normal respiratory effort; lungs are clear to auscultation bilaterally  CARDIOVASCULAR: Regular rate and rhythm, normal S1 and S2, no murmur/rub/gallop; No lower extremity edema  ABDOMEN: Nontender to palpation, normoactive bowel sounds  PSYCH: A+O to person  NEUROLOGY: No focal deficits    LABS:                        12.6   4.79  )-----------( 444      ( 08 Dec 2021 08:17 )             37.0     12-08    136  |  105  |  11  ----------------------------<  119<H>  4.7   |  24  |  0.80    Ca    9.1      08 Dec 2021 08:17  Phos  3.1     12-08  Mg     2.40     12-08    TPro  6.8  /  Alb  3.3  /  TBili  0.6  /  DBili  <0.2  /  AST  54<H>  /  ALT  55<H>  /  AlkPhos  63  12-07      RADIOLOGY & ADDITIONAL TESTS: No new imaging  Results Reviewed: Yes  Imaging Personally Reviewed:  Electrocardiogram Personally Reviewed:    COORDINATION OF CARE:  Care Discussed with Consultants/Other Providers [Y/N]:  Prior or Outpatient Records Reviewed [Y/N]:  
Dr. Maite Espino  Pager [phone]    PROGRESS NOTE:     Patient is a 71y old  Female who presents with a chief complaint of AMS (02 Dec 2021 16:12)      SUBJECTIVE / OVERNIGHT EVENTS: pt denies chest pain or sob,   ADDITIONAL REVIEW OF SYSTEMS: afebrile     MEDICATIONS  (STANDING):  cefTRIAXone   IVPB 1000 milliGRAM(s) IV Intermittent every 24 hours  enoxaparin Injectable 40 milliGRAM(s) SubCutaneous daily  lactobacillus acidophilus 1 Tablet(s) Oral two times a day  polyethylene glycol 3350 17 Gram(s) Oral daily  potassium phosphate / sodium phosphate Powder (PHOS-NaK) 1 Packet(s) Oral two times a day  remdesivir  IVPB 100 milliGRAM(s) IV Intermittent every 24 hours  remdesivir  IVPB   IV Intermittent   senna 2 Tablet(s) Oral at bedtime  sodium chloride 0.9%. 1000 milliLiter(s) (65 mL/Hr) IV Continuous <Continuous>  thiamine 100 milliGRAM(s) Oral daily    MEDICATIONS  (PRN):  acetaminophen     Tablet .. 650 milliGRAM(s) Oral every 6 hours PRN Temp greater or equal to 38C (100.4F)      CAPILLARY BLOOD GLUCOSE        I&O's Summary    02 Dec 2021 07:01  -  03 Dec 2021 07:00  --------------------------------------------------------  IN: 1480 mL / OUT: 500 mL / NET: 980 mL    03 Dec 2021 07:01  -  03 Dec 2021 13:46  --------------------------------------------------------  IN: 120 mL / OUT: 0 mL / NET: 120 mL        PHYSICAL EXAM:  Vital Signs Last 24 Hrs  T(C): 36.6 (03 Dec 2021 11:28), Max: 36.8 (02 Dec 2021 20:00)  T(F): 97.8 (03 Dec 2021 11:28), Max: 98.3 (02 Dec 2021 20:00)  HR: 82 (03 Dec 2021 11:28) (75 - 84)  BP: 107/67 (03 Dec 2021 11:28) (104/60 - 114/78)  BP(mean): --  RR: 16 (03 Dec 2021 11:28) (16 - 18)  SpO2: 100% (03 Dec 2021 11:28) (100% - 100%)  CONSTITUTIONAL: NAD, frail   RESPIRATORY: Normal Wob, lungs mainly clear  CARDIOVASCULAR: Regular rate and rhythm, normal S1 and S2, no murmur/rub/gallop; No lower extremity edema; Peripheral pulses are 2+ bilaterally  ABDOMEN: Nontender to palpation, normoactive bowel sounds, no rebound/guarding;   MUSCULOSKELETAL: no clubbing or cyanosis of digits; no joint swelling or tenderness to palpation  PSYCH: A+O x1, demented    LABS:                        11.0   4.42  )-----------( 348      ( 03 Dec 2021 07:19 )             32.1     12-03    140  |  107  |  11  ----------------------------<  88  3.9   |  25  |  0.67    Ca    8.6      03 Dec 2021 07:19  Phos  2.4     12-03  Mg     2.20     12-03    TPro  6.2  /  Alb  2.9<L>  /  TBili  0.6  /  DBili  <0.2  /  AST  17  /  ALT  22  /  AlkPhos  46  12-03      CARDIAC MARKERS ( 03 Dec 2021 07:19 )  x     / x     / 125 U/L / x     / x      CARDIAC MARKERS ( 02 Dec 2021 07:40 )  x     / x     / 195 U/L / x     / x          Culture - Blood (collected 30 Nov 2021 22:57)  Source: .Blood Blood-Peripheral  Preliminary Report (01 Dec 2021 23:02):    No growth to date.    Culture - Blood (collected 30 Nov 2021 22:55)  Source: .Blood Blood-Peripheral  Preliminary Report (01 Dec 2021 23:02):    No growth to date.    Culture - Urine (collected 30 Nov 2021 21:02)  Source: Clean Catch Clean Catch (Midstream)  Final Report (01 Dec 2021 22:35):    No growth        RADIOLOGY & ADDITIONAL TESTS:  Results Reviewed:   Imaging Personally Reviewed:  Electrocardiogram Personally Reviewed:    COORDINATION OF CARE:  Care Discussed with Consultants/Other Providers [Y/N]: jeffrey fuentes, repeat swab 12/9  Prior or Outpatient Records Reviewed [Y/N]:  
Chanel Lee  [facility]  Pager #[phone]    Patient is a 71y old  Female who presents with a chief complaint of AMS (06 Dec 2021 15:11)      SUBJECTIVE / OVERNIGHT EVENTS: Patient seen and examined at bedside. Patient says "hi," "I'm okay." Denies having pain or difficulty with urination. Straight cathed overnight with 450cc urine.    ADDITIONAL REVIEW OF SYSTEMS:    MEDICATIONS  (STANDING):  enoxaparin Injectable 40 milliGRAM(s) SubCutaneous daily  lactobacillus acidophilus 1 Tablet(s) Oral two times a day  polyethylene glycol 3350 17 Gram(s) Oral daily  senna 2 Tablet(s) Oral at bedtime  sodium chloride 0.9%. 1000 milliLiter(s) (65 mL/Hr) IV Continuous <Continuous>  thiamine 100 milliGRAM(s) Oral daily    MEDICATIONS  (PRN):  acetaminophen     Tablet .. 650 milliGRAM(s) Oral every 6 hours PRN Temp greater or equal to 38C (100.4F)      CAPILLARY BLOOD GLUCOSE        I&O's Summary    06 Dec 2021 07:01  -  07 Dec 2021 07:00  --------------------------------------------------------  IN: 400 mL / OUT: 450 mL / NET: -50 mL    07 Dec 2021 07:01  -  07 Dec 2021 13:31  --------------------------------------------------------  IN: 0 mL / OUT: 0 mL / NET: 0 mL        PHYSICAL EXAM:    Vital Signs Last 24 Hrs  T(C): 36.6 (07 Dec 2021 12:30), Max: 36.6 (07 Dec 2021 12:30)  T(F): 97.9 (07 Dec 2021 12:30), Max: 97.9 (07 Dec 2021 12:30)  HR: 68 (07 Dec 2021 12:30) (68 - 86)  BP: 105/72 (07 Dec 2021 12:30) (100/72 - 117/81)  BP(mean): --  RR: 16 (07 Dec 2021 12:30) (16 - 16)  SpO2: 99% (07 Dec 2021 12:30) (99% - 100%)    CONSTITUTIONAL: NAD, elderly female  EYES: Conjunctiva and sclera clear  ENMT: Moist oral mucosa  RESPIRATORY: Normal respiratory effort; lungs are clear to auscultation bilaterally  CARDIOVASCULAR: Regular rate and rhythm, normal S1 and S2, no murmur/rub/gallop; No lower extremity edema  ABDOMEN: Nontender to palpation, normoactive bowel sounds  PSYCH: A+O to person  NEUROLOGY: No focal deficits    LABS:                        12.3   5.30  )-----------( 412      ( 07 Dec 2021 05:10 )             37.3     12-07    135  |  101  |  11  ----------------------------<  85  4.8   |  24  |  0.74    Ca    9.5      07 Dec 2021 05:10  Phos  3.3     12-07  Mg     2.40     12-07    TPro  6.8  /  Alb  3.3  /  TBili  0.6  /  DBili  <0.2  /  AST  54<H>  /  ALT  55<H>  /  AlkPhos  63  12-07      CARDIAC MARKERS ( 06 Dec 2021 08:32 )  x     / x     / 56 U/L / x     / x        RADIOLOGY & ADDITIONAL TESTS: No new imaging  Results Reviewed: Yes  Imaging Personally Reviewed:  Electrocardiogram Personally Reviewed:    COORDINATION OF CARE:  Care Discussed with Consultants/Other Providers [Y/N]:  Prior or Outpatient Records Reviewed [Y/N]:  
Dr. Maite Espino  Pager [phone]    PROGRESS NOTE:     Patient is a 71y old  Female who presents with a chief complaint of AMS (04 Dec 2021 13:21)      SUBJECTIVE / OVERNIGHT EVENTS: pt denies chest pain or sob   ADDITIONAL REVIEW OF SYSTEMS: afebrile     MEDICATIONS  (STANDING):  cefTRIAXone   IVPB 1000 milliGRAM(s) IV Intermittent every 24 hours  enoxaparin Injectable 40 milliGRAM(s) SubCutaneous daily  lactobacillus acidophilus 1 Tablet(s) Oral two times a day  polyethylene glycol 3350 17 Gram(s) Oral daily  remdesivir  IVPB 100 milliGRAM(s) IV Intermittent every 24 hours  remdesivir  IVPB   IV Intermittent   senna 2 Tablet(s) Oral at bedtime  sodium chloride 0.9%. 1000 milliLiter(s) (65 mL/Hr) IV Continuous <Continuous>  thiamine 100 milliGRAM(s) Oral daily    MEDICATIONS  (PRN):  acetaminophen     Tablet .. 650 milliGRAM(s) Oral every 6 hours PRN Temp greater or equal to 38C (100.4F)      CAPILLARY BLOOD GLUCOSE        I&O's Summary    04 Dec 2021 07:01  -  05 Dec 2021 07:00  --------------------------------------------------------  IN: 200 mL / OUT: 0 mL / NET: 200 mL        PHYSICAL EXAM:  Vital Signs Last 24 Hrs  T(C): 36.5 (05 Dec 2021 05:43), Max: 36.5 (05 Dec 2021 05:43)  T(F): 97.7 (05 Dec 2021 05:43), Max: 97.7 (05 Dec 2021 05:43)  HR: 82 (05 Dec 2021 05:43) (82 - 88)  BP: 105/69 (05 Dec 2021 05:43) (105/69 - 112/91)  BP(mean): --  RR: 16 (05 Dec 2021 05:43) (16 - 16)  SpO2: 97% (05 Dec 2021 05:43) (96% - 97%)  CONSTITUTIONAL: NAD, frail   RESPIRATORY: Normal Wob, lungs mainly clear  CARDIOVASCULAR: Regular rate and rhythm, normal S1 and S2, no murmur/rub/gallop; No lower extremity edema; Peripheral pulses are 2+ bilaterally  ABDOMEN: Nontender to palpation, normoactive bowel sounds, no rebound/guarding;   MUSCULOSKELETAL: no clubbing or cyanosis of digits; no joint swelling or tenderness to palpation  PSYCH: A+O x1-2, demented    LABS:                        12.0   4.67  )-----------( 426      ( 05 Dec 2021 05:17 )             35.0     12-05    136  |  102  |  8   ----------------------------<  91  4.0   |  23  |  0.68    Ca    9.2      05 Dec 2021 05:17  Phos  2.6     12-05  Mg     2.10     12-05    TPro  6.7  /  Alb  3.3  /  TBili  0.6  /  DBili  <0.2  /  AST  62<H>  /  ALT  42<H>  /  AlkPhos  57  12-05                RADIOLOGY & ADDITIONAL TESTS:  Results Reviewed:   Imaging Personally Reviewed:  Electrocardiogram Personally Reviewed:    COORDINATION OF CARE:  Care Discussed with Consultants/Other Providers [Y/N]:  Prior or Outpatient Records Reviewed [Y/N]:  
Dr. Maite Espino  Pager [phone]    PROGRESS NOTE:     Patient is a 71y old  Female who presents with a chief complaint of AMS, covid (03 Dec 2021 13:46)      SUBJECTIVE / OVERNIGHT EVENTS: pt denies chest pain or sob   ADDITIONAL REVIEW OF SYSTEMS: afebrile     MEDICATIONS  (STANDING):  cefTRIAXone   IVPB 1000 milliGRAM(s) IV Intermittent every 24 hours  enoxaparin Injectable 40 milliGRAM(s) SubCutaneous daily  lactobacillus acidophilus 1 Tablet(s) Oral two times a day  polyethylene glycol 3350 17 Gram(s) Oral daily  remdesivir  IVPB 100 milliGRAM(s) IV Intermittent every 24 hours  remdesivir  IVPB   IV Intermittent   senna 2 Tablet(s) Oral at bedtime  sodium chloride 0.9%. 1000 milliLiter(s) (65 mL/Hr) IV Continuous <Continuous>  thiamine 100 milliGRAM(s) Oral daily    MEDICATIONS  (PRN):  acetaminophen     Tablet .. 650 milliGRAM(s) Oral every 6 hours PRN Temp greater or equal to 38C (100.4F)      CAPILLARY BLOOD GLUCOSE        I&O's Summary    03 Dec 2021 07:01  -  04 Dec 2021 07:00  --------------------------------------------------------  IN: 320 mL / OUT: 740 mL / NET: -420 mL        PHYSICAL EXAM:  Vital Signs Last 24 Hrs  T(C): 36.3 (04 Dec 2021 10:19), Max: 36.8 (03 Dec 2021 21:30)  T(F): 97.4 (04 Dec 2021 10:19), Max: 98.2 (03 Dec 2021 21:30)  HR: 72 (04 Dec 2021 10:19) (72 - 72)  BP: 100/62 (04 Dec 2021 10:19) (100/62 - 116/90)  BP(mean): --  RR: 17 (04 Dec 2021 10:19) (16 - 17)  SpO2: 100% (04 Dec 2021 10:19) (99% - 100%)  CONSTITUTIONAL: NAD, frail   RESPIRATORY: Normal Wob, lungs mainly clear  CARDIOVASCULAR: Regular rate and rhythm, normal S1 and S2, no murmur/rub/gallop; No lower extremity edema; Peripheral pulses are 2+ bilaterally  ABDOMEN: Nontender to palpation, normoactive bowel sounds, no rebound/guarding;   MUSCULOSKELETAL: no clubbing or cyanosis of digits; no joint swelling or tenderness to palpation  PSYCH: A+O x1, demented  LABS:                        10.9   3.87  )-----------( 374      ( 04 Dec 2021 08:21 )             32.1     12-04    137  |  104  |  9   ----------------------------<  86  4.1   |  24  |  0.68    Ca    8.6      04 Dec 2021 08:21  Phos  2.8     12-04  Mg     2.10     12-04    TPro  6.3  /  Alb  3.1<L>  /  TBili  0.5  /  DBili  <0.2  /  AST  26  /  ALT  23  /  AlkPhos  49  12-04      CARDIAC MARKERS ( 03 Dec 2021 07:19 )  x     / x     / 125 U/L / x     / x                RADIOLOGY & ADDITIONAL TESTS:  Results Reviewed:   Imaging Personally Reviewed:  Electrocardiogram Personally Reviewed:    COORDINATION OF CARE:  Care Discussed with Consultants/Other Providers [Y/N]:  Prior or Outpatient Records Reviewed [Y/N]:  
Dr. Maite Espino  Pager [phone]    PROGRESS NOTE:     Patient is a 71y old  Female who presents with a chief complaint of AMS (2021 23:41)      SUBJECTIVE / OVERNIGHT EVENTS: pt is lethargic and weak, no specific complaints, curled up in bed   ADDITIONAL REVIEW OF SYSTEMS: afebrile     MEDICATIONS  (STANDING):  azithromycin  IVPB 500 milliGRAM(s) IV Intermittent every 24 hours  cefTRIAXone   IVPB 1000 milliGRAM(s) IV Intermittent every 24 hours  enoxaparin Injectable 40 milliGRAM(s) SubCutaneous daily  polyethylene glycol 3350 17 Gram(s) Oral daily  remdesivir  IVPB   IV Intermittent   senna 2 Tablet(s) Oral at bedtime    MEDICATIONS  (PRN):  acetaminophen     Tablet .. 650 milliGRAM(s) Oral every 6 hours PRN Temp greater or equal to 38C (100.4F)      CAPILLARY BLOOD GLUCOSE      POCT Blood Glucose.: 79 mg/dL (2021 16:56)    I&O's Summary      PHYSICAL EXAM:  Vital Signs Last 24 Hrs  T(C): 36.8 (01 Dec 2021 11:57), Max: 38.4 (2021 19:19)  T(F): 98.3 (01 Dec 2021 11:57), Max: 101.1 (2021 19:19)  HR: 85 (01 Dec 2021 11:57) (66 - 103)  BP: 102/63 (01 Dec 2021 11:57) (102/61 - 143/78)  BP(mean): --  RR: 16 (01 Dec 2021 11:57) (16 - 24)  SpO2: 100% (01 Dec 2021 11:57) (97% - 100%)  CONSTITUTIONAL: NAD, frail   RESPIRATORY: Normal WBC, lungs mainly clear  CARDIOVASCULAR: Regular rate and rhythm, normal S1 and S2, no murmur/rub/gallop; No lower extremity edema; Peripheral pulses are 2+ bilaterally  ABDOMEN: Nontender to palpation, normoactive bowel sounds, no rebound/guarding;   MUSCULOSKELETAL: no clubbing or cyanosis of digits; no joint swelling or tenderness to palpation  PSYCH: A+O to person, place,  lethargic and weak    LABS:                        12.3   4.71  )-----------( 305      ( 2021 20:17 )             36.9     11-30    140  |  103  |  15  ----------------------------<  109<H>  3.7   |  25  |  0.98    Ca    9.2      2021 20:17  Mg     2.30         TPro  7.7  /  Alb  3.8  /  TBili  1.2  /  DBili  x   /  AST  32  /  ALT  46<H>  /  AlkPhos  53      PT/INR - ( 01 Dec 2021 05:14 )   PT: 12.1 sec;   INR: 1.05 ratio               Urinalysis Basic - ( 2021 21:33 )    Color: Blanca / Appearance: Clear / S.028 / pH: x  Gluc: x / Ketone: Negative  / Bili: Small / Urobili: 12 mg/dL   Blood: x / Protein: 30 mg/dL / Nitrite: Negative   Leuk Esterase: Negative / RBC: 2 /HPF / WBC 2 /HPF   Sq Epi: x / Non Sq Epi: 1 /HPF / Bacteria: Moderate      RADIOLOGY & ADDITIONAL TESTS:  Results Reviewed:   Imaging Personally Reviewed:  < from: CT Head No Cont (21 @ 03:35) >  IMPRESSION:  Cerebral volume loss more than expected for the patient's age.    No mass effect, hemorrhage or other evidence of acute intracranial pathology.      < from: CT Chest No Cont (21 @ 03:35) >  IMPRESSION: Opacities throughout the right lung representing multifocal pneumonia likely corresponding to history of Covid although other superimposed infectious etiologies cannot be excluded.    Electrocardiogram Personally Reviewed:    COORDINATION OF CARE:  Care Discussed with Consultants/Other Providers [Y/N]: acp Morteza, start remdesivir  Prior or Outpatient Records Reviewed [Y/N]:

## 2021-12-08 NOTE — PROGRESS NOTE ADULT - PROBLEM SELECTOR PROBLEM 6
Need for prophylactic measure
Constipation

## 2021-12-08 NOTE — DISCHARGE NOTE NURSING/CASE MANAGEMENT/SOCIAL WORK - PATIENT PORTAL LINK FT
You can access the FollowMyHealth Patient Portal offered by [facility] by registering at the following website: [email or web address]. By joining [facility]’s FollowMyHealth portal, you will also be able to view your health information using other applications (apps) compatible with our system.

## 2021-12-16 PROBLEM — F03.90 UNSPECIFIED DEMENTIA WITHOUT BEHAVIORAL DISTURBANCE: Chronic | Status: ACTIVE | Noted: 2021-12-01

## 2021-12-16 PROBLEM — F03.90 UNSPECIFIED DEMENTIA, UNSPECIFIED SEVERITY, WITHOUT BEHAVIORAL DISTURBANCE, PSYCHOTIC DISTURBANCE, MOOD DISTURBANCE, AND ANXIETY: Chronic | Status: ACTIVE | Noted: 2021-12-01

## 2021-12-22 ENCOUNTER — NON-APPOINTMENT (OUTPATIENT)
Age: 71
End: 2021-12-22

## 2021-12-23 ENCOUNTER — APPOINTMENT (OUTPATIENT)
Dept: CARE COORDINATION | Facility: HOME HEALTH | Age: 71
End: 2021-12-23

## 2022-10-01 NOTE — ED PROVIDER NOTE - CLINICAL SUMMARY MEDICAL DECISION MAKING FREE TEXT BOX
Pt to ed with blue lego in right nare. PT was seen in peds office but MD was unable to retrieve. Resps easy, non labored. 70 Y/O F w/ PMH of dementia presents with daughter Lashaun for AMS.   Sepsis protocol  R/O infectious etiology. Likely admit for FTT 70 Y/O F w/ PMH of dementia presents with daughter Lashaun for AMS.   Sepsis protocol  R/O infectious etiology. Likely admit for FTT    Will admit to medicine for PNA vs covid PNA management  FTT  Discussed w/ Laurie Hancock

## 2023-03-14 NOTE — PROGRESS NOTE ADULT - PROBLEM SELECTOR PROBLEM 1
Sepsis due to pneumonia

## 2023-07-22 ENCOUNTER — EMERGENCY (EMERGENCY)
Facility: HOSPITAL | Age: 73
LOS: 1 days | Discharge: HOME | End: 2023-07-22
Attending: EMERGENCY MEDICINE | Admitting: PERSONAL EMERGENCY RESPONSE ATTENDANT
Payer: MEDICAID

## 2023-07-22 VITALS
TEMPERATURE: 98 F | SYSTOLIC BLOOD PRESSURE: 114 MMHG | RESPIRATION RATE: 18 BRPM | DIASTOLIC BLOOD PRESSURE: 74 MMHG | OXYGEN SATURATION: 100 % | HEART RATE: 88 BPM

## 2023-07-22 LAB
ALBUMIN SERPL ELPH-MCNC: 4 G/DL — SIGNIFICANT CHANGE UP (ref 3.3–5)
ALP SERPL-CCNC: 71 U/L — SIGNIFICANT CHANGE UP (ref 40–120)
ALT FLD-CCNC: 17 U/L — SIGNIFICANT CHANGE UP (ref 4–33)
ANION GAP SERPL CALC-SCNC: 14 MMOL/L — SIGNIFICANT CHANGE UP (ref 7–14)
APTT BLD: 30.1 SEC — SIGNIFICANT CHANGE UP (ref 27–36.3)
AST SERPL-CCNC: 26 U/L — SIGNIFICANT CHANGE UP (ref 4–32)
BASOPHILS # BLD AUTO: 0.03 K/UL — SIGNIFICANT CHANGE UP (ref 0–0.2)
BASOPHILS NFR BLD AUTO: 0.4 % — SIGNIFICANT CHANGE UP (ref 0–2)
BILIRUB SERPL-MCNC: 0.8 MG/DL — SIGNIFICANT CHANGE UP (ref 0.2–1.2)
BUN SERPL-MCNC: 14 MG/DL — SIGNIFICANT CHANGE UP (ref 7–23)
CALCIUM SERPL-MCNC: 9.2 MG/DL — SIGNIFICANT CHANGE UP (ref 8.4–10.5)
CHLORIDE SERPL-SCNC: 102 MMOL/L — SIGNIFICANT CHANGE UP (ref 98–107)
CO2 SERPL-SCNC: 21 MMOL/L — LOW (ref 22–31)
CREAT SERPL-MCNC: 0.98 MG/DL — SIGNIFICANT CHANGE UP (ref 0.5–1.3)
EGFR: 62 ML/MIN/1.73M2 — SIGNIFICANT CHANGE UP
EOSINOPHIL # BLD AUTO: 0 K/UL — SIGNIFICANT CHANGE UP (ref 0–0.5)
EOSINOPHIL NFR BLD AUTO: 0 % — SIGNIFICANT CHANGE UP (ref 0–6)
GLUCOSE SERPL-MCNC: 95 MG/DL — SIGNIFICANT CHANGE UP (ref 70–99)
HCT VFR BLD CALC: 39.6 % — SIGNIFICANT CHANGE UP (ref 34.5–45)
HGB BLD-MCNC: 13 G/DL — SIGNIFICANT CHANGE UP (ref 11.5–15.5)
IANC: 6.06 K/UL — SIGNIFICANT CHANGE UP (ref 1.8–7.4)
IMM GRANULOCYTES NFR BLD AUTO: 0.3 % — SIGNIFICANT CHANGE UP (ref 0–0.9)
INR BLD: 1.12 RATIO — SIGNIFICANT CHANGE UP (ref 0.88–1.16)
LYMPHOCYTES # BLD AUTO: 0.77 K/UL — LOW (ref 1–3.3)
LYMPHOCYTES # BLD AUTO: 10 % — LOW (ref 13–44)
MAGNESIUM SERPL-MCNC: 2.3 MG/DL — SIGNIFICANT CHANGE UP (ref 1.6–2.6)
MCHC RBC-ENTMCNC: 31.7 PG — SIGNIFICANT CHANGE UP (ref 27–34)
MCHC RBC-ENTMCNC: 32.8 GM/DL — SIGNIFICANT CHANGE UP (ref 32–36)
MCV RBC AUTO: 96.6 FL — SIGNIFICANT CHANGE UP (ref 80–100)
MONOCYTES # BLD AUTO: 0.82 K/UL — SIGNIFICANT CHANGE UP (ref 0–0.9)
MONOCYTES NFR BLD AUTO: 10.6 % — SIGNIFICANT CHANGE UP (ref 2–14)
NEUTROPHILS # BLD AUTO: 6.06 K/UL — SIGNIFICANT CHANGE UP (ref 1.8–7.4)
NEUTROPHILS NFR BLD AUTO: 78.7 % — HIGH (ref 43–77)
NRBC # BLD: 0 /100 WBCS — SIGNIFICANT CHANGE UP (ref 0–0)
NRBC # FLD: 0 K/UL — SIGNIFICANT CHANGE UP (ref 0–0)
PHOSPHATE SERPL-MCNC: 3.5 MG/DL — SIGNIFICANT CHANGE UP (ref 2.5–4.5)
PLATELET # BLD AUTO: 149 K/UL — LOW (ref 150–400)
POTASSIUM SERPL-MCNC: 5 MMOL/L — SIGNIFICANT CHANGE UP (ref 3.5–5.3)
POTASSIUM SERPL-SCNC: 5 MMOL/L — SIGNIFICANT CHANGE UP (ref 3.5–5.3)
PROT SERPL-MCNC: 7.3 G/DL — SIGNIFICANT CHANGE UP (ref 6–8.3)
PROTHROM AB SERPL-ACNC: 13 SEC — SIGNIFICANT CHANGE UP (ref 10.5–13.4)
RBC # BLD: 4.1 M/UL — SIGNIFICANT CHANGE UP (ref 3.8–5.2)
RBC # FLD: 12.4 % — SIGNIFICANT CHANGE UP (ref 10.3–14.5)
SODIUM SERPL-SCNC: 137 MMOL/L — SIGNIFICANT CHANGE UP (ref 135–145)
T4 AB SER-ACNC: 5.39 UG/DL — SIGNIFICANT CHANGE UP (ref 5.1–13)
TSH SERPL-MCNC: 0.32 UIU/ML — SIGNIFICANT CHANGE UP (ref 0.27–4.2)
WBC # BLD: 7.7 K/UL — SIGNIFICANT CHANGE UP (ref 3.8–10.5)
WBC # FLD AUTO: 7.7 K/UL — SIGNIFICANT CHANGE UP (ref 3.8–10.5)

## 2023-07-22 PROCEDURE — 71046 X-RAY EXAM CHEST 2 VIEWS: CPT | Mod: 26

## 2023-07-22 PROCEDURE — 99285 EMERGENCY DEPT VISIT HI MDM: CPT

## 2023-07-22 PROCEDURE — 93010 ELECTROCARDIOGRAM REPORT: CPT

## 2023-07-22 RX ORDER — SODIUM CHLORIDE 9 MG/ML
1000 INJECTION INTRAMUSCULAR; INTRAVENOUS; SUBCUTANEOUS ONCE
Refills: 0 | Status: COMPLETED | OUTPATIENT
Start: 2023-07-22 | End: 2023-07-22

## 2023-07-22 RX ADMIN — SODIUM CHLORIDE 1000 MILLILITER(S): 9 INJECTION INTRAMUSCULAR; INTRAVENOUS; SUBCUTANEOUS at 22:02

## 2023-07-22 NOTE — ED PROVIDER NOTE - ATTENDING CONTRIBUTION TO CARE
Brief HPI:  73-year-old female past medical history of dementia, not on medication, no history of abdominal surgery, nonverbal presents with adult daughter for decreased appetite and weakness for 2 days.  Daughter says that patient has not been eating for the last 2 days.  Patient received 1 dose of Tylenol at 3 PM without resolution of symptoms.  No sick contacts.  Daughter denies trauma, vomiting, perceived abdominal pain, diarrhea, bloody or dark stools.    Vitals:   Reviewed    Exam:    GEN:  Non-toxic appearing, non-distressed, non-diaphoretic, AAOx0  HEENT:  NCAT, neck supple, EOMI, PERRLA, sclera anicteric, no conjunctival pallor or injection, no stridor, no tonsillar exudate, uvula midline  CV:  regular rhythm and rate, s1/s2 audible, no murmurs, rubs or gallops, peripheral pulses 2+ and symmetric  PULM:  non-labored respirations, lungs clear to auscultation bilaterally, no wheezes, crackles or rales  ABD:  non distended, non-tender, no rebound, no guarding, negative Jauregui's sign, bowel sounds normal, no cvat  MSK:  no gross deformity, non-tender extremities and joints, range of motion grossly normal appearing, no extremity edema, extremities warm and well perfused   NEURO:  alert, CN II-XII intact, motor 5/5 in upper and lower extremities bilaterally, sensation grossly intact in extremities and trunk  SKIN:  warm, dry, no rash or vesicles     A/P:  73-year-old female past medical history of dementia, not on medication, no history of abdominal surgery, nonverbal presents with adult daughter for decreased appetite and weakness for 2 days.  Vital signs stable, afebrile rectally.  Nontoxic-appearing, no abdominal tenderness, well perfused on exam.  Per daughter patient is at normal alertness and behavior.  Unclear etiology of symptoms, possible occult infection such as UTI or virus.  Low concern for acute surgical pathology of abdomen and at this time given benign abdominal exam.  Will send labs, supportive care.  Disposition pending.

## 2023-07-22 NOTE — ED PROVIDER NOTE - PROGRESS NOTE DETAILS
Attending MD Sy.  Pt signed out to me in stable condition pending labs, CT head, ua, CXR, shared decision making, 72 yo fem pmhx dementia, no meds/surgeries, lives with family, now reduced appetite from nl baseline, isolated sx, no apparent pain/fevers, at non-verbal mental baseline, 99.6 rectal temp in ED. Attending MD Sy.  Pt with no fever on rectal temp, no actionable findings on labs/urine/imaging.  Family comfortable with discharge plan and f/u for hematuria of unclear significance and PCP f/u in 2-3 days.

## 2023-07-22 NOTE — ED ADULT NURSE NOTE - OBJECTIVE STATEMENT
Pt arrives to the ED nonverbal, breathing even and unlabored in bed. As per daughter pt is ambulatory with no assist at baseline. Pt daughter reports bringing her in for "not looking well". Daughter states she feels like her mother is sick, daughter poor historian. Daughter states pt has been having decrease PO intake and "looks sick". Daughter denies any N/V/D/C, SOB, dizziness, falls, fevers, cough at home. Pt unable to understand commands or speak due to dementia as per daughter. Bed in lowest position, call bell within reach, daughter at bedside.

## 2023-07-22 NOTE — ED ADULT TRIAGE NOTE - CHIEF COMPLAINT QUOTE
Pt arrives  ambulatory to triage with Daughter as per Daughter " For past 2 days she is not eating, feels warm and is weak. She has dementia and talks but will not answer normally." No reported recent falls.  As per family, no vomiting , no diarreah, Pt baseline+ incontinence awake alert, not following simple directions, not verbalizing during triage. Pt arrives  ambulatory to triage with Daughter as per Daughter " For past 2 days she is not eating, feels warm and is weak. She has dementia and talks but will not answer normally." No reported recent falls.  As per family, no vomiting , no diarreah, Pt baseline+ incontinence awake alert, not following simple directions, not verbalizing during triage.  [facility] Pt to rm [facility]

## 2023-07-22 NOTE — ED PROVIDER NOTE - NSFOLLOWUPINSTRUCTIONS_ED_ALL_ED_FT
The results of any blood tests and imaging studies completed during your visit today were discussed and explained to you and a copy provided with your discharge instructions.     If she starts to experience any symptoms of acute weakness, dizziness, or changes in her mental status, fever, please bring her in to the ED for re-evaluation.

## 2023-07-22 NOTE — ED ADULT NURSE NOTE - NSFALLRISKINTERV_ED_ALL_ED
Assistance OOB with selected safe patient handling equipment if applicable/Assistance with ambulation/Communicate fall risk and risk factors to all staff, patient, and family/Monitor gait and stability/Monitor for mental status changes and reorient to person, place, and time, as needed/Provide visual cue: yellow wristband, yellow gown, etc/Reinforce activity limits and safety measures with patient and family/Toileting schedule using arm’s reach rule for commode and bathroom/Use of alarms - bed, stretcher, chair and/or video monitoring/Call bell, personal items and telephone in reach/Instruct patient to call for assistance before getting out of bed/chair/stretcher/Non-slip footwear applied when patient is off stretcher/[place] to call system/Physically safe environment - no spills, clutter or unnecessary equipment/Purposeful Proactive Rounding/Room/bathroom lighting operational, light cord in reach

## 2023-07-22 NOTE — ED ADULT NURSE NOTE - NSFALLUNIVINTERV_ED_ALL_ED
Bed/Stretcher in lowest position, wheels locked, appropriate side rails in place/Call bell, personal items and telephone in reach/Instruct patient to call for assistance before getting out of bed/chair/stretcher/Non-slip footwear applied when patient is off stretcher/[place] to call system/Physically safe environment - no spills, clutter or unnecessary equipment/Purposeful proactive rounding/Room/bathroom lighting operational, light cord in reach

## 2023-07-22 NOTE — ED PROVIDER NOTE - PATIENT PORTAL LINK FT
You can access the FollowMyHealth Patient Portal offered by [facility] by registering at the following website: [email or web address]. By joining [employer]’s FollowMyHealth portal, you will also be able to view your health information using other applications (apps) compatible with our system.

## 2023-07-22 NOTE — ED ADULT NURSE NOTE - CHIEF COMPLAINT QUOTE
Pt arrives  ambulatory to triage with Daughter as per Daughter " For past 2 days she is not eating, feels warm and is weak. She has dementia and talks but will not answer normally." No reported recent falls.  As per family, no vomiting , no diarreah, Pt baseline+ incontinence awake alert, not following simple directions, not verbalizing during triage.  [facility]. Pt to rm [facility]

## 2023-07-22 NOTE — ED PROVIDER NOTE - NS ED ROS FT
GENERAL: admits to fever of unconfirmed temp  EYES: no change in vision   HEENT: no trouble swallowing or speaking   : No changes in urination   SKIN: no rashes   MSK: No joint pain     All other ROS negative unless otherwise specified in HPI. Pt is unable to verbalize symptoms or complaints.

## 2023-07-22 NOTE — ED PROVIDER NOTE - PHYSICAL EXAMINATION
PHYSICAL EXAM:    GENERAL: NAD  HEENT:  Atraumatic  CHEST/LUNG: Chest rise equal bilaterally  HEART: Regular rate and rhythm  ABDOMEN: Soft, Nontender, Nondistended  EXTREMITIES:  Extremities warm  PSYCH: A&Ox0  SKIN: No obvious rashes or lesions  MSK: No cervical spine TTP, able to range neck to the left and right/ No midline spinal TTP/ No swelling, redness, pain or discharge from   NEUROLOGY: strength and sensation intact in all extremities. CN 2 - 12 intact. Finger to nose test intact. No pronator drift. Ambulatory without difficulty.

## 2023-07-22 NOTE — ED PROVIDER NOTE - OBJECTIVE STATEMENT
73 F with PMHx of dementia not on any medications brought in by daughter for decreased appetite and weakness for 2 days. Pt is AAOx0, patient does not follow commands, and history is provided by daughter. Daughter states that the patient has had decreased appetite and only had one meal today. Also admits to feeling warm but did not check temperature. Gave 1 dose of tylenol at 3pm without resolution of symptoms. Denies diarrhea, vomiting, dizziness.

## 2023-07-22 NOTE — ED PROVIDER NOTE - NSICDXPASTSURGICALHX_GEN_ALL_CORE_FT
----- Message from Laila De La Cruz sent at 5/8/2020  3:43 PM CDT -----  I scheduled crutch training/fitting for Brittany Cid on 6-2-20.  Sx is scheduled for surgical arthroscopy of rt knee w/patellar chondroplasty on 6-11-20 at [facility].  Patient needs crutches.  Please put a PT order in Epic.  Thank you.  dv       PAST SURGICAL HISTORY:  No significant past surgical history

## 2023-07-22 NOTE — ED PROVIDER NOTE - CLINICAL SUMMARY MEDICAL DECISION MAKING FREE TEXT BOX
72 y/o F with PMHx of dementia AAOx0 and does not follow commands brought in by daughter for concerns of fever and decreased appetite. Unconfirmed temp at home but febrile upon rectal temp. Other vital signs stable. Physical exam was unremarkable. Clinical suspicion for infection vs. dehydration. disposition pending labs, cxr, urine culture    labs  cxr  urine culture  1L NS  CT head noncon

## 2023-07-23 VITALS
SYSTOLIC BLOOD PRESSURE: 131 MMHG | TEMPERATURE: 98 F | RESPIRATION RATE: 18 BRPM | DIASTOLIC BLOOD PRESSURE: 90 MMHG | HEART RATE: 90 BPM | OXYGEN SATURATION: 100 %

## 2023-07-23 LAB
APPEARANCE UR: CLEAR — SIGNIFICANT CHANGE UP
BACTERIA # UR AUTO: NEGATIVE /HPF — SIGNIFICANT CHANGE UP
BILIRUB UR-MCNC: NEGATIVE — SIGNIFICANT CHANGE UP
COLOR SPEC: SIGNIFICANT CHANGE UP
DIFF PNL FLD: ABNORMAL
EPI CELLS # UR: 6 — SIGNIFICANT CHANGE UP
GLUCOSE UR QL: NEGATIVE MG/DL — SIGNIFICANT CHANGE UP
KETONES UR-MCNC: ABNORMAL MG/DL
LEUKOCYTE ESTERASE UR-ACNC: NEGATIVE — SIGNIFICANT CHANGE UP
NITRITE UR-MCNC: NEGATIVE — SIGNIFICANT CHANGE UP
PH UR: 6 — SIGNIFICANT CHANGE UP (ref 5–8)
PROT UR-MCNC: 100 MG/DL
RBC CASTS # UR COMP ASSIST: SIGNIFICANT CHANGE UP /HPF (ref 0–4)
SP GR SPEC: 1.03 — SIGNIFICANT CHANGE UP (ref 1–1.03)
UROBILINOGEN FLD QL: 1 MG/DL — SIGNIFICANT CHANGE UP (ref 0.2–1)
WBC UR QL: <5 /HPF — SIGNIFICANT CHANGE UP (ref 0–5)

## 2023-07-23 PROCEDURE — 70450 CT HEAD/BRAIN W/O DYE: CPT | Mod: 26,MA

## 2023-07-23 RX ORDER — HALOPERIDOL DECANOATE 100 MG/ML
2 INJECTION INTRAMUSCULAR ONCE
Refills: 0 | Status: COMPLETED | OUTPATIENT
Start: 2023-07-23 | End: 2023-07-23

## 2023-07-23 RX ADMIN — HALOPERIDOL DECANOATE 2 MILLIGRAM(S): 100 INJECTION INTRAMUSCULAR at 01:53

## 2023-07-23 NOTE — ED ADULT NURSE REASSESSMENT NOTE - NS ED NURSE REASSESS COMMENT FT1
Pt at baseline mental status, breathing even and unlabored in bed. Pt resting comfortable at this time. Pt awaiting CT results. Bed in lowest position, family at bedside.

## 2023-07-24 LAB
CULTURE RESULTS: NO GROWTH — SIGNIFICANT CHANGE UP
SPECIMEN SOURCE: SIGNIFICANT CHANGE UP

## 2023-10-19 ENCOUNTER — APPOINTMENT (OUTPATIENT)
Dept: INTERNAL MEDICINE | Facility: CLINIC | Age: 73
End: 2023-10-19
Payer: MEDICAID

## 2023-10-19 VITALS
OXYGEN SATURATION: 95 % | BODY MASS INDEX: 17.08 KG/M2 | RESPIRATION RATE: 16 BRPM | WEIGHT: 87 LBS | HEIGHT: 60 IN | HEART RATE: 96 BPM

## 2023-10-19 DIAGNOSIS — E07.9 DISORDER OF THYROID, UNSPECIFIED: ICD-10-CM

## 2023-10-19 DIAGNOSIS — D64.9 ANEMIA, UNSPECIFIED: ICD-10-CM

## 2023-10-19 DIAGNOSIS — F02.818 ALZHEIMER'S DISEASE WITH LATE ONSET: ICD-10-CM

## 2023-10-19 DIAGNOSIS — R63.4 ABNORMAL WEIGHT LOSS: ICD-10-CM

## 2023-10-19 DIAGNOSIS — G30.1 ALZHEIMER'S DISEASE WITH LATE ONSET: ICD-10-CM

## 2023-10-19 DIAGNOSIS — E78.5 HYPERLIPIDEMIA, UNSPECIFIED: ICD-10-CM

## 2023-10-19 PROCEDURE — 99203 OFFICE O/P NEW LOW 30 MIN: CPT

## 2023-10-19 RX ORDER — TRAZODONE HYDROCHLORIDE 50 MG/1
50 TABLET ORAL
Qty: 15 | Refills: 1 | Status: DISCONTINUED | COMMUNITY
Start: 2017-05-23 | End: 2023-10-19

## 2023-10-19 RX ORDER — SIMVASTATIN 20 MG/1
20 TABLET, FILM COATED ORAL
Refills: 0 | Status: DISCONTINUED | COMMUNITY
Start: 2017-05-23 | End: 2023-10-19

## 2023-10-19 RX ORDER — DONEPEZIL HYDROCHLORIDE 10 MG/1
10 TABLET ORAL DAILY
Qty: 30 | Refills: 2 | Status: DISCONTINUED | COMMUNITY
Start: 2017-08-23 | End: 2023-10-19

## 2023-10-26 LAB
ALBUMIN SERPL ELPH-MCNC: 4.5 G/DL
ALP BLD-CCNC: 83 U/L
ALT SERPL-CCNC: 10 U/L
ANION GAP SERPL CALC-SCNC: 10 MMOL/L
AST SERPL-CCNC: 14 U/L
BASOPHILS # BLD AUTO: 0.05 K/UL
BASOPHILS NFR BLD AUTO: 1 %
BILIRUB SERPL-MCNC: 0.8 MG/DL
BUN SERPL-MCNC: 16 MG/DL
CALCIUM SERPL-MCNC: 9.8 MG/DL
CHLORIDE SERPL-SCNC: 114 MMOL/L
CO2 SERPL-SCNC: 27 MMOL/L
CREAT SERPL-MCNC: 0.92 MG/DL
EGFR: 67 ML/MIN/1.73M2
EOSINOPHIL # BLD AUTO: 0.05 K/UL
EOSINOPHIL NFR BLD AUTO: 1 %
FERRITIN SERPL-MCNC: 252 NG/ML
FOLATE SERPL-MCNC: 13.3 NG/ML
GLUCOSE SERPL-MCNC: 141 MG/DL
HCT VFR BLD CALC: 45.6 %
HGB BLD-MCNC: 13.9 G/DL
IMM GRANULOCYTES NFR BLD AUTO: 0.2 %
IRON SATN MFR SERPL: 33 %
IRON SERPL-MCNC: 83 UG/DL
LYMPHOCYTES # BLD AUTO: 1.59 K/UL
LYMPHOCYTES NFR BLD AUTO: 30.8 %
MAN DIFF?: NORMAL
MCHC RBC-ENTMCNC: 30.5 GM/DL
MCHC RBC-ENTMCNC: 31.9 PG
MCV RBC AUTO: 104.6 FL
MONOCYTES # BLD AUTO: 0.35 K/UL
MONOCYTES NFR BLD AUTO: 6.8 %
NEUTROPHILS # BLD AUTO: 3.12 K/UL
NEUTROPHILS NFR BLD AUTO: 60.2 %
PLATELET # BLD AUTO: 262 K/UL
POTASSIUM SERPL-SCNC: 4.4 MMOL/L
PROT SERPL-MCNC: 7.1 G/DL
RBC # BLD: 4.36 M/UL
RBC # FLD: 13.8 %
SODIUM SERPL-SCNC: 152 MMOL/L
T4 FREE SERPL-MCNC: 1 NG/DL
T4 SERPL-MCNC: 6.4 UG/DL
TIBC SERPL-MCNC: 252 UG/DL
TSH SERPL-ACNC: 0.75 UIU/ML
UIBC SERPL-MCNC: 169 UG/DL
VIT B12 SERPL-MCNC: 498 PG/ML
WBC # FLD AUTO: 5.17 K/UL

## 2024-01-31 NOTE — ED ADULT NURSE NOTE - TEMPLATE
"  Subjective:   PATIENT ID: Nicole Quiñones is a 51 y.o. female. Non-smoker. Employment HX: , currently self employed.    Seen [facility] ortho for same DX since n/a.   CHIEF COMPLAINT: Knee Pain of the Left Knee (F/U Lt Knee. Pain Level 10 today. Not Taking anything for pain. Pt doing Home exercises.)    HPI:    Left aching medial knee pain.   Injury: no known injury  Onset: several years ago fluctuates   Modifying Factors: worse with activity, improves with rest, stiffness after immobilization, and improves with less than 30 minutes activity  Associated Symptoms: crepitus, decreased ROM, swelling, "giving out", and locking/ catching with ROM   Activity: sedentary with light activity and pain moderately interferes with ADLs   Previous Treatments:  HEP withTheraBand, RX NSAIDs, and CSI since 2021 last injection 7/31/23  with some relief but symptoms returning  PMH: + RA  Family History:  unknown family history of OA    NOTE: Follow up  left knee DJD.  Conservative treatments attempted at prior visit of HEP, supportive shoes with arch support, RX PT recommended at prior visit and patient has not completed to date.  Pain has persisted. CSI given 7/31/23 with good relief, but only lasting 1-2 days.   Patient reporting giving out.  Here for MRI results.      Current Outpatient Medications:     albuterol (PROVENTIL) 2.5 mg /3 mL (0.083 %) nebulizer solution, USE 1 VIAL IN NEBULIZER EVERY 8 HOURS AS NEEDED, Disp: 1 each, Rfl: 3    azaTHIOprine (IMURAN) 50 mg Tab, Start Imuran 50 mg daily and after 1 week increase dose to 100 mg daily.  Hold for infection or fever., Disp: 60 tablet, Rfl: 3    baclofen (LIORESAL) 5 mg Tab tablet, Take 1 tablet (5 mg total) by mouth 2 (two) times daily as needed (tension)., Disp: 30 tablet, Rfl: 2    cetirizine (ZYRTEC) 10 MG tablet, Take 10 mg by mouth once daily., Disp: , Rfl:     cloNIDine (CATAPRES) 0.1 MG tablet, Take 1 tablet by mouth daily as needed., Disp: , Rfl:     DULERA " 100-5 mcg/actuation HFAA, Inhale 1 puff into the lungs 2 (two) times daily., Disp: , Rfl:     EMGALITY  mg/mL PnIj, AS DIRECTED SUBCUTANEOUS MONTHLY 30 DAY(S), Disp: 1 each, Rfl: 11    ergocalciferol (ERGOCALCIFEROL) 50,000 unit Cap, Take 1 capsule (50,000 Units total) by mouth every 7 days., Disp: 12 capsule, Rfl: 3    ezetimibe (ZETIA) 10 mg tablet, Take 10 mg by mouth once daily., Disp: , Rfl:     fluticasone propionate (FLONASE) 50 mcg/actuation nasal spray, 2 sprays (100 mcg total) by Each Nostril route daily as needed for Allergies or Rhinitis., Disp: 18.2 mL, Rfl: 1    folic acid (FOLVITE) 1 MG tablet, Take 1 tablet (1,000 mcg total) by mouth once daily., Disp: 30 tablet, Rfl: 3    gabapentin (NEURONTIN) 600 MG tablet, Take 600 mg by mouth 2 (two) times daily., Disp: , Rfl:     hydroxychloroquine (PLAQUENIL) 200 mg tablet, Take 1 tablet (200 mg total) by mouth 2 (two) times daily. After food, Disp: 60 tablet, Rfl: 5    isosorbide dinitrate (ISORDIL) 40 MG Tab, Take 1 tablet (40 mg total) by mouth once daily., Disp: 90 tablet, Rfl: 2    LINZESS 145 mcg Cap capsule, Take 145 mcg by mouth once daily., Disp: , Rfl:     metoprolol succinate (TOPROL-XL) 100 MG 24 hr tablet, Take 1 tablet (100 mg total) by mouth every evening., Disp: 90 tablet, Rfl: 3    nitroGLYCERIN (NITROSTAT) 0.4 MG SL tablet, Place 1 tablet (0.4 mg total) under the tongue every 5 (five) minutes as needed for Chest pain., Disp: 25 tablet, Rfl: 4    ondansetron (ZOFRAN) 8 MG tablet, TAKE 1 TABLET BY MOUTH EVERY 8 HOURS AS NEEDED FOR NAUSEA AND VOMITING, Disp: 9 tablet, Rfl: 3    pantoprazole (PROTONIX) 20 MG tablet, Take 1 tablet (20 mg total) by mouth once daily., Disp: 30 tablet, Rfl: 3    RESTASIS 0.05 % ophthalmic emulsion, Place 1 drop into both eyes 2 (two) times daily., Disp: , Rfl:     sucralfate (CARAFATE) 1 gram tablet, Take 1 g by mouth 3 (three) times daily., Disp: , Rfl:     UBRELVY 100 mg tablet, Take 1 tablet (100 mg total)  General "by mouth 2 (two) times daily as needed for Migraine., Disp: 16 tablet, Rfl: 2    valsartan (DIOVAN) 80 MG tablet, Take 1 tablet (80 mg total) by mouth once daily., Disp: 90 tablet, Rfl: 3    venlafaxine (EFFEXOR-XR) 37.5 MG 24 hr capsule, Take 1 capsule (37.5 mg total) by mouth once daily., Disp: 30 capsule, Rfl: 11    zavegepant 10 mg/actuation Spry, 10 mg by Nasal route daily as needed (Migraine)., Disp: 1 each, Rfl: 5    Current Facility-Administered Medications:     atropine injection 0.4 mg, 0.4 mg, Intravenous, 1 time in Clinic/HOD, Nathalia Cherry PA-C    metoprolol injection 1 mg, 1 mg, Intravenous, 1 time in Clinic/HOD, Nathalia Cherry PA-C  Review of patient's allergies indicates:   Allergen Reactions    Nsaids (non-steroidal anti-inflammatory drug) Other (See Comments)     Causes ulcers to bleed.    Ibuprofen     Latex     Meloxicam     Nsaids (non-steroidal anti-inflammatory drug) Nausea Only     Hemoglobin A1c   Date Value Ref Range Status   05/23/2023 5.3 <=7.0 % Final      Body mass index is 39.51 kg/m².   Vitals:    01/31/24 0851   BP: 124/84   Pulse: 80   Temp: 97.7 °F (36.5 °C)   TempSrc: Oral   Weight: 111 kg (244 lb 12.8 oz)   Height: 5' 6" (1.676 m)   PainSc: 10-Worst pain ever      REVIEW OF SYSTEMS:  A ten-point review of systems was performed and is negative, except as mentioned above   Objective:   MSK Bilateral Knee  General:  No apparent distress, no pain indicators, obesity   Inspection: limping gait, mild effusion, full weight bearing, no erythema, no contusion, mild quad deconditioning, varus deformity   Palpation: LEFT knee tenderness with palpation at medial joint line, peripatellar soft tissue , non-tender: patellar tendon, tibial plateau  ROM:    Active Flexion / Extension (0-140):  0 / 135 non-painful (R)  0 / 119 painful (L)  Strength:   Flexion     5 / 5 (R)  4 / 5 (L)  Extension     5 / 5 (R)  4 / 5 (L)  Special Testing:  IT Band Testing  Chrissy's Test:  -- (R)  -- " (L)  Effusion Testing  Ballotable Effusion:  -- (R)  -- (L)  Fluid Wave:    -- (R)  -- (L)  Patellar Testing  Patellar Grind:    -- (R)  + (L)  Patellar Facet Pain:  -- (R)  + (L)  Apprehension:    -- (R)  -- (L)  Meniscal Testing  Inderjit's test:    -- (R)  + (L)  Thessaly's Test:    Not tested (R)  not tested (L)  Ligament Testing  ACL Anterior Drawer:   -- (R) -- (L)  PCL Posterior Drawer:   -- (R) -- (L)  LCL Varus Test:    -- (R) -- (L)  MCL Valgus Test:    -- (R) -- (L)  Hip Exam normal  Ankle Exam normal  Neurovascular: Intact to light touch  Neuro/ Psych: Awake, alert, oriented, normal mood and affect  Lymphatic: No LAD  Skin/ Soft Tissue: no rash, skin intact  Assessment:   IMAGING: X-ray 4 views of left knee dated 5/29/23 reviewed and independently interpreted by me.  Discussed with patient. Noted no acute, DJD noted.    XR KNEE COMP 4 OR MORE VIEWS LEFT 5/29/23  CLINICAL HISTORY:  Unilateral primary osteoarthritis, left knee  COMPARISON:  None.  FINDINGS:  No acute displaced fractures or dislocations.  There is some narrowing of the medial compartment of the knee joint articular spaces are otherwise preserved with smooth articular surfaces  No blastic or lytic lesions.  Soft tissues within normal limits.  Impression:  Degenerative changes    MRI KNEE WITHOUT CONTRAST LEFT 10/3/23  CLINICAL HISTORY:  51-year-old woman with chronic left knee pain.  TECHNIQUE:  Axial T2 fat sat, sagittal T2 fat sat, sagittal PD, coronal PD fat sat, coronal T1, and thin slice axial T2 fat-sat meniscal non-contrast 1.5 T magnetic resonance imaging was performed through the left knee per routine protocol.  COMPARISON:  None.  Correlation is made with left knee radiographs of 05/29/2023  FINDINGS:  There is a small suprapatellar effusion and an osteochondral body is suggested at the lateral suprapatellar pouch, measuring 8 mm in greatest dimension.  There is chronic patella lois with no acute malalignment.  There is moderate to  severe diffuse attenuation and fibrillation of the patellofemoral articular cartilage with associated moderate marginal osteophyte formation.  There is subtle thin subchondral edema like degenerative marrow change across the inferior pole of the patella and at the far lateral facet of the trochlea.  There is also moderate to severe diffuse attenuation of the medial more advanced than lateral femorotibial articular cartilage.  There are several small areas of subchondral edema like degenerative marrow change along the lateral femoral condyle.  There is moderate marginal osteophyte formation at the medial and lateral femorotibial articulations.  No additional osteochondral body is identified.  The ACL and PCL are intact and normal.  There is mild to moderate chronic myxoid degeneration at the body and posterior horn of the medial meniscus and mild chronic myxoid degeneration in the lateral meniscus meniscal tear identified.  The body of the medial meniscus is partially extruded medially due to the medial femorotibial degenerative arthrosis.  The medial and lateral ligaments and tendons demonstrate no acute or significant abnormality.  The quadriceps tendon and patellofemoral retinacular complexes are normal and there is no abnormality at the quadriceps fat pad.  The patellar tendon demonstrates mild proximal and distal tendinosis without edema or tear.  There is no abnormality at Hoffa's fat pad.  There is no bursitis.  There is no muscle edema or atrophy.  The proximal tibiofibular joint is normal.  There is no acute marrow signal abnormality or osseous lesion.  No acute or chronic fracture.  Impression:  Small left knee joint effusion.  Moderate to severe tricompartmental degenerative arthrosis at the left knee with subcentimeter osteochondral body at the lateral suprapatellar pouch.  No acute pathology identified.    EMR REVIEW: completed with noted prior visit 7/31/23 reviewed.    DIAGNOSIS:  1. Primary  osteoarthritis of left knee    2. BMI 39.0-39.9,adult      Plan:   \Ongoing education about DX and treatment recommendations including conservative treatments of daily HEP with TheraBand, BMI reduction goal 5-10% of body weight (215# overall goal for BMI <40), muscle strengthening with use of stationary bike (RPM set at 80 or > with slow progression to goal of 40 minutes 3-4 times per week as tolerated), adequate vit D/C, glucosamine 1500 mg/day and daily acetaminophen 1000 mg 3 times/ day if able to tolerate.    Treatment plan: Moderate exacerbation of chronic Left Moderate-to-severe knee arthritis w/o internal derangement complicated by obesity Patient requesting to try VS injection for Left knee arthritis with failed conservative treatments including: RX NSAID, CSI, and HEP > 3 months.    MRI results and images reviewed. Extended time spent with patient educating on MRI findings and possible treatment options. At this time MRI findings, HPI and PE do not mandate surgical treatments.  Discussed non-surgical treatment options including but not limited to: RX PT, HEP, BMI reduction, VS and/or CSI for symptoms relief.   Patient elects to try Vs injections and RX PT.      RX PT   instructed to contact insurance provider in order to obtain provider which takes patient's insurance   Patient verbalized understanding, agrees to plan and denies further questions.   Reeducated on need for RX PT to improve gait and strength and reduce stiffness and pain.     Procedure: n/a  RX Medications: continue medications as RX per PCP; .  RTC: next available procedure only visit  once PA obtained   NOTE: None    Elsie Carter FNP    Procedure Note:   None    Total Time Spent with Patient: 30 minutes .  Visit Start Time: 0930  10 minutes spent prior to visit reviewing EMR, prior labs and x-rays.  10 minutes spent in visit with patient face-to-face time completing exam, obtaining history, educating on DX and treatment plan.  10  minutes spent after visit completing EMR documentation.   Visit End Time: 1656

## 2024-03-26 NOTE — DIETITIAN INITIAL EVALUATION ADULT. - PROBLEM SELECTOR PLAN 3
Spoke with patient, informed of dr de la torre's recommendations, patient also feels that it is muscle pain and wondering about some type of injection dr de la torre had mentioned to him    Routed back to dr de la torre as pt is inquiring about an injection. Will call patient back with dr de la torre's recommendations once he reviews message   superimposed on bl dementia, decreased conversation, requiring further assistance w/ ambulation, bathing, feeding  likely iso acute infxn  - CTH NC r/o primary neuro etiology  - q4h neurocheck  - monitor for improvement on abx

## 2024-09-10 ENCOUNTER — EMERGENCY (EMERGENCY)
Facility: HOSPITAL | Age: 74
LOS: 1 days | Discharge: HOME | End: 2024-09-10
Attending: EMERGENCY MEDICINE | Admitting: EMERGENCY MEDICINE
Payer: MEDICAID

## 2024-09-10 VITALS
WEIGHT: 100.97 LBS | DIASTOLIC BLOOD PRESSURE: 88 MMHG | HEART RATE: 120 BPM | HEIGHT: 61 IN | RESPIRATION RATE: 19 BRPM | TEMPERATURE: 98 F | SYSTOLIC BLOOD PRESSURE: 162 MMHG | OXYGEN SATURATION: 92 %

## 2024-09-10 PROCEDURE — 70450 CT HEAD/BRAIN W/O DYE: CPT | Mod: 26,MC

## 2024-09-10 PROCEDURE — 99284 EMERGENCY DEPT VISIT MOD MDM: CPT | Mod: GC

## 2024-09-10 PROCEDURE — 93010 ELECTROCARDIOGRAM REPORT: CPT

## 2024-09-10 PROCEDURE — 71045 X-RAY EXAM CHEST 1 VIEW: CPT | Mod: 26

## 2024-09-10 NOTE — ED ADULT NURSE NOTE - OBJECTIVE STATEMENT
Pt received, awake and responsive to verbal and tactile stimuli. history of dementia. Per family, pt was more lethargic and weak x1 day. Denies fever, chills, nausea, vomiting, diarrhea. Breathing is equal and unlabored on room air. NAD. Pendign dispo.

## 2024-09-10 NOTE — ED ADULT TRIAGE NOTE - CHIEF COMPLAINT QUOTE
Pt daughter states change in mental status since last night. Daughter states pt not ambulating/more lethargic, Hx Dementia. Baseline non verba baseline Pt daughter states change in mental status since last night. Daughter states pt not ambulating/more lethargic, Hx Dementia. Baseline non verbal baseline, pt minimally cooperative with triage/ O2 nasal cannula. Pt daughter states change in mental status since last night. Daughter states pt not ambulating/more lethargic, Hx Dementia. Baseline non verbal baseline, pt minimally cooperative with triage/ O2 nasal cannula. Noted drooling from mouth. Pt daughter states change in mental status since last night. Daughter states pt not ambulating/more lethargic, Hx Dementia. Baseline non verbal. pt minimally cooperative with triage/ O2 nasal cannula. Noted drooling from mouth.

## 2024-09-10 NOTE — ED PROVIDER NOTE - PROGRESS NOTE DETAILS
[facility] PGY2: UA with signs of infection.  cultures sent.  labs and ct head without acute findings.  will dc with rx for abx and pcp fu.  patient and family updated, understand and agree.

## 2024-09-10 NOTE — ED PROVIDER NOTE - CLINICAL SUMMARY MEDICAL DECISION MAKING FREE TEXT BOX
74-year-old female, history of dementia, brought in by daughter and son-in-law for 1 day of generalized weakness, decreased p.o., decreased energy, changes in speech.,  Reports patient normally ambulatory at baseline but today required assistance with walking which is unusual.  No nausea, vomiting, fever, chills, cough, congestion.  States patient normally very vocal now speech is somewhat mumbling and incoherent.  Initial vital sign heart rate tachycardic but heart rate normal on auscultation.  Remainder vital signs stable.  Patient elderly, no acute distress, heart regular rate and rhythm, lungs clear, abdomen soft and nontender, moves all 4 extremities spontaneously, AO x 4 does not answer questions.  Will assess for infectious\metabolic process, acute intracranial pathology.  Symptoms also possibly in the setting of worsening dementia.  Plan for basic labs, viral panel, urinalysis and culture, chest x-ray, CT head without contrast.

## 2024-09-10 NOTE — ED PROVIDER NOTE - CARE PLAN
Principal Discharge DX:	Altered mental status   1 Principal Discharge DX:	Altered mental status  Secondary Diagnosis:	Acute UTI

## 2024-09-10 NOTE — ED PROVIDER NOTE - PATIENT PORTAL LINK FT
You can access the FollowMyHealth Patient Portal offered by [facility] by registering at the following website: [email or web address]. By joining [facility]’s FollowMyHealth portal, you will also be able to view your health information using other applications (apps) compatible with our system.

## 2024-09-10 NOTE — ED PROVIDER NOTE - WR ORDER STATUS 1
Alert-The patient is alert, awake and responds to voice. The patient is oriented to time, place, and person. The triage nurse is able to obtain subjective information. Resulted

## 2024-09-10 NOTE — ED ADULT NURSE NOTE - CHIEF COMPLAINT QUOTE
Pt daughter states change in mental status since last night. Daughter states pt not ambulating/more lethargic, Hx Dementia. Baseline non verbal. pt minimally cooperative with triage/ O2 nasal cannula. Noted drooling from mouth.

## 2024-09-10 NOTE — ED PROVIDER NOTE - ATTENDING CONTRIBUTION TO CARE
Brief HPI:  74-year-old female past medical history of dementia presents for generalized fatigue, decreased eating and drinking, soft voice for the last 2 to 3 days.  Son states that patient has appeared more tired than usual.  No trauma or falls.  Denies fever, chills, nausea, vomiting, diarrhea, bloody or dark stool.  Patient unable to provide history due to her dementia.  History obtained from adult son and daughter-in-law at bedside.    Vitals:   Reviewed    Exam:    GEN:  Non-toxic appearing, non-distressed, speaking full sentences, non-diaphoretic, Labs all extremities, not following commands, alert  HEENT:  NCAT, neck supple, EOMI, PERRLA, sclera anicteric, no conjunctival pallor or injection, no stridor,  no tonsillar exudate, uvula midline  CV:  regular rhythm and rate, s1/s2 audible, no murmurs, rubs or gallops, peripheral pulses 2+ and symmetric  PULM:  non-labored respirations, lungs clear to auscultation bilaterally, no wheezes, crackles or rales  ABD:  non distended, non-tender, no rebound, no guarding, negative Jauregui's sign, bowel sounds normal, no cvat  MSK:  no gross deformity, non-tender extremities and joints, range of motion grossly normal appearing, no extremity edema, extremities warm and well perfused   NEURO:  Alert, ANO x 0, moves all extremities, no focal deficits  SKIN:  warm, dry, no rash or vesicles     A/P:  74-year-old female past medical history of dementia presents for generalized fatigue, decreased eating and drinking, soft voice for the last 2 to 3 days.  Son states that patient has appeared more tired than usual.  Alert but provides no history.  Limited neuroexam but, no focal neurodeficits.  Possible metabolic versus infectious etiology.  Low concern for acute CVA.  Will obtain labs, head CT, supportive care.  Disposition pending.

## 2024-09-10 NOTE — ED PROVIDER NOTE - NSFOLLOWUPINSTRUCTIONS_ED_ALL_ED_FT
You were seen in the Emergency Department for altered mental status and generalized weakness.  Your evaluation today shows your symptoms are not from any dangerous or life-threatening causes.  You were found to have a urinary tract infection.  To treat this, we have sent a prescription for an antibiotic for your pharmacy.  We recommend you follow up with your primary care physician     1) Continue all previously prescribed medications as directed.    2) Follow up with your primary care physician - take copies of your results.    3) Return to the Emergency Department for worsening or persistent symptoms, and/or ANY NEW OR CONCERNING SYMPTOMS.

## 2024-09-11 VITALS
DIASTOLIC BLOOD PRESSURE: 83 MMHG | SYSTOLIC BLOOD PRESSURE: 138 MMHG | RESPIRATION RATE: 17 BRPM | OXYGEN SATURATION: 95 % | TEMPERATURE: 97 F | HEART RATE: 64 BPM

## 2024-09-11 LAB
ALBUMIN SERPL ELPH-MCNC: 3.9 G/DL — SIGNIFICANT CHANGE UP (ref 3.3–5)
ALP SERPL-CCNC: 88 U/L — SIGNIFICANT CHANGE UP (ref 40–120)
ALT FLD-CCNC: 18 U/L — SIGNIFICANT CHANGE UP (ref 4–33)
AMORPH CRY # UR COMP ASSIST: PRESENT
ANION GAP SERPL CALC-SCNC: 13 MMOL/L — SIGNIFICANT CHANGE UP (ref 7–14)
APPEARANCE UR: ABNORMAL
AST SERPL-CCNC: 25 U/L — SIGNIFICANT CHANGE UP (ref 4–32)
BACTERIA # UR AUTO: ABNORMAL /HPF
BASOPHILS # BLD AUTO: 0.04 K/UL — SIGNIFICANT CHANGE UP (ref 0–0.2)
BASOPHILS NFR BLD AUTO: 0.6 % — SIGNIFICANT CHANGE UP (ref 0–2)
BILIRUB SERPL-MCNC: 0.7 MG/DL — SIGNIFICANT CHANGE UP (ref 0.2–1.2)
BILIRUB UR-MCNC: NEGATIVE — SIGNIFICANT CHANGE UP
BUN SERPL-MCNC: 14 MG/DL — SIGNIFICANT CHANGE UP (ref 7–23)
CALCIUM SERPL-MCNC: 8.8 MG/DL — SIGNIFICANT CHANGE UP (ref 8.4–10.5)
CAST: 1 /LPF — SIGNIFICANT CHANGE UP (ref 0–4)
CHLORIDE SERPL-SCNC: 102 MMOL/L — SIGNIFICANT CHANGE UP (ref 98–107)
CO2 SERPL-SCNC: 22 MMOL/L — SIGNIFICANT CHANGE UP (ref 22–31)
COLOR SPEC: YELLOW — SIGNIFICANT CHANGE UP
CREAT SERPL-MCNC: 0.71 MG/DL — SIGNIFICANT CHANGE UP (ref 0.5–1.3)
DIFF PNL FLD: ABNORMAL
EGFR: 90 ML/MIN/1.73M2 — SIGNIFICANT CHANGE UP
EOSINOPHIL # BLD AUTO: 0.03 K/UL — SIGNIFICANT CHANGE UP (ref 0–0.5)
EOSINOPHIL NFR BLD AUTO: 0.5 % — SIGNIFICANT CHANGE UP (ref 0–6)
FLUAV AG NPH QL: SIGNIFICANT CHANGE UP
FLUBV AG NPH QL: SIGNIFICANT CHANGE UP
GLUCOSE SERPL-MCNC: 102 MG/DL — HIGH (ref 70–99)
GLUCOSE UR QL: NEGATIVE MG/DL — SIGNIFICANT CHANGE UP
HCT VFR BLD CALC: 37.8 % — SIGNIFICANT CHANGE UP (ref 34.5–45)
HGB BLD-MCNC: 12.8 G/DL — SIGNIFICANT CHANGE UP (ref 11.5–15.5)
IANC: 4.02 K/UL — SIGNIFICANT CHANGE UP (ref 1.8–7.4)
IMM GRANULOCYTES NFR BLD AUTO: 0.3 % — SIGNIFICANT CHANGE UP (ref 0–0.9)
KETONES UR-MCNC: ABNORMAL MG/DL
LEUKOCYTE ESTERASE UR-ACNC: ABNORMAL
LYMPHOCYTES # BLD AUTO: 1.82 K/UL — SIGNIFICANT CHANGE UP (ref 1–3.3)
LYMPHOCYTES # BLD AUTO: 27.6 % — SIGNIFICANT CHANGE UP (ref 13–44)
MAGNESIUM SERPL-MCNC: 2.3 MG/DL — SIGNIFICANT CHANGE UP (ref 1.6–2.6)
MCHC RBC-ENTMCNC: 32.1 PG — SIGNIFICANT CHANGE UP (ref 27–34)
MCHC RBC-ENTMCNC: 33.9 GM/DL — SIGNIFICANT CHANGE UP (ref 32–36)
MCV RBC AUTO: 94.7 FL — SIGNIFICANT CHANGE UP (ref 80–100)
MONOCYTES # BLD AUTO: 0.67 K/UL — SIGNIFICANT CHANGE UP (ref 0–0.9)
MONOCYTES NFR BLD AUTO: 10.2 % — SIGNIFICANT CHANGE UP (ref 2–14)
NEUTROPHILS # BLD AUTO: 4.02 K/UL — SIGNIFICANT CHANGE UP (ref 1.8–7.4)
NEUTROPHILS NFR BLD AUTO: 60.8 % — SIGNIFICANT CHANGE UP (ref 43–77)
NITRITE UR-MCNC: NEGATIVE — SIGNIFICANT CHANGE UP
NRBC # BLD: 0 /100 WBCS — SIGNIFICANT CHANGE UP (ref 0–0)
NRBC # FLD: 0 K/UL — SIGNIFICANT CHANGE UP (ref 0–0)
PH UR: 7 — SIGNIFICANT CHANGE UP (ref 5–8)
PLATELET # BLD AUTO: 255 K/UL — SIGNIFICANT CHANGE UP (ref 150–400)
POTASSIUM SERPL-MCNC: 4.7 MMOL/L — SIGNIFICANT CHANGE UP (ref 3.5–5.3)
POTASSIUM SERPL-SCNC: 4.7 MMOL/L — SIGNIFICANT CHANGE UP (ref 3.5–5.3)
PROT SERPL-MCNC: 6.9 G/DL — SIGNIFICANT CHANGE UP (ref 6–8.3)
PROT UR-MCNC: 30 MG/DL
RBC # BLD: 3.99 M/UL — SIGNIFICANT CHANGE UP (ref 3.8–5.2)
RBC # FLD: 12.9 % — SIGNIFICANT CHANGE UP (ref 10.3–14.5)
RBC CASTS # UR COMP ASSIST: 58 /HPF — HIGH (ref 0–4)
REVIEW: SIGNIFICANT CHANGE UP
RSV RNA NPH QL NAA+NON-PROBE: SIGNIFICANT CHANGE UP
SARS-COV-2 RNA SPEC QL NAA+PROBE: SIGNIFICANT CHANGE UP
SODIUM SERPL-SCNC: 137 MMOL/L — SIGNIFICANT CHANGE UP (ref 135–145)
SP GR SPEC: 1.02 — SIGNIFICANT CHANGE UP (ref 1–1.03)
SQUAMOUS # UR AUTO: 8 /HPF — HIGH (ref 0–5)
UROBILINOGEN FLD QL: 1 MG/DL — SIGNIFICANT CHANGE UP (ref 0.2–1)
WBC # BLD: 6.6 K/UL — SIGNIFICANT CHANGE UP (ref 3.8–10.5)
WBC # FLD AUTO: 6.6 K/UL — SIGNIFICANT CHANGE UP (ref 3.8–10.5)
WBC UR QL: 8 /HPF — HIGH (ref 0–5)

## 2024-09-11 RX ORDER — CEPHALEXIN 500 MG
500 CAPSULE ORAL ONCE
Refills: 0 | Status: COMPLETED | OUTPATIENT
Start: 2024-09-11 | End: 2024-09-11

## 2024-09-11 RX ORDER — CEPHALEXIN 500 MG
1 CAPSULE ORAL
Qty: 30 | Refills: 0
Start: 2024-09-11 | End: 2024-09-20

## 2024-09-11 RX ADMIN — Medication 500 MILLIGRAM(S): at 02:30

## 2024-09-11 NOTE — ED ADULT NURSE REASSESSMENT NOTE - NS ED NURSE REASSESS COMMENT FT1
Break Coverage RN: Pt nonverbal, respirations equal and unlabored. Pt resting in stretcher at this time, offers no complaints. Straight cath performed using sterile procedure. 50cc of urine drained. Sample sent to lab. Pending results. No acute distress noted. Safety maintained, bed in lowest position, side rails raised, call bell in reach.

## 2024-09-13 LAB
CULTURE RESULTS: ABNORMAL
SPECIMEN SOURCE: SIGNIFICANT CHANGE UP

## 2024-09-18 ENCOUNTER — EMERGENCY (EMERGENCY)
Facility: HOSPITAL | Age: 74
LOS: 1 days | Discharge: HOME | End: 2024-09-18
Attending: EMERGENCY MEDICINE | Admitting: EMERGENCY MEDICINE
Payer: MEDICAID

## 2024-09-18 VITALS
OXYGEN SATURATION: 97 % | RESPIRATION RATE: 17 BRPM | DIASTOLIC BLOOD PRESSURE: 84 MMHG | SYSTOLIC BLOOD PRESSURE: 125 MMHG | TEMPERATURE: 99 F | HEART RATE: 81 BPM | WEIGHT: 95.02 LBS | HEIGHT: 61 IN

## 2024-09-18 LAB
ALBUMIN SERPL ELPH-MCNC: 3.1 G/DL — LOW (ref 3.3–5)
ALP SERPL-CCNC: 82 U/L — SIGNIFICANT CHANGE UP (ref 40–120)
ALT FLD-CCNC: 20 U/L — SIGNIFICANT CHANGE UP (ref 4–33)
ANION GAP SERPL CALC-SCNC: 18 MMOL/L — HIGH (ref 7–14)
AST SERPL-CCNC: 49 U/L — HIGH (ref 4–32)
BASOPHILS # BLD AUTO: 0.05 K/UL — SIGNIFICANT CHANGE UP (ref 0–0.2)
BASOPHILS NFR BLD AUTO: 0.4 % — SIGNIFICANT CHANGE UP (ref 0–2)
BILIRUB SERPL-MCNC: 0.6 MG/DL — SIGNIFICANT CHANGE UP (ref 0.2–1.2)
BUN SERPL-MCNC: 43 MG/DL — HIGH (ref 7–23)
CALCIUM SERPL-MCNC: 9.4 MG/DL — SIGNIFICANT CHANGE UP (ref 8.4–10.5)
CHLORIDE SERPL-SCNC: 107 MMOL/L — SIGNIFICANT CHANGE UP (ref 98–107)
CO2 SERPL-SCNC: 15 MMOL/L — LOW (ref 22–31)
CREAT SERPL-MCNC: 0.78 MG/DL — SIGNIFICANT CHANGE UP (ref 0.5–1.3)
EGFR: 81 ML/MIN/1.73M2 — SIGNIFICANT CHANGE UP
EGFR: 81 ML/MIN/1.73M2 — SIGNIFICANT CHANGE UP
EOSINOPHIL # BLD AUTO: 0.04 K/UL — SIGNIFICANT CHANGE UP (ref 0–0.5)
EOSINOPHIL NFR BLD AUTO: 0.4 % — SIGNIFICANT CHANGE UP (ref 0–6)
GLUCOSE SERPL-MCNC: 107 MG/DL — HIGH (ref 70–99)
HCT VFR BLD CALC: 44.6 % — SIGNIFICANT CHANGE UP (ref 34.5–45)
HGB BLD-MCNC: 14.7 G/DL — SIGNIFICANT CHANGE UP (ref 11.5–15.5)
IANC: 8.62 K/UL — HIGH (ref 1.8–7.4)
IMM GRANULOCYTES NFR BLD AUTO: 0.4 % — SIGNIFICANT CHANGE UP (ref 0–0.9)
LYMPHOCYTES # BLD AUTO: 1.37 K/UL — SIGNIFICANT CHANGE UP (ref 1–3.3)
LYMPHOCYTES # BLD AUTO: 12.1 % — LOW (ref 13–44)
MAGNESIUM SERPL-MCNC: 3.1 MG/DL — HIGH (ref 1.6–2.6)
MCHC RBC-ENTMCNC: 32.3 PG — SIGNIFICANT CHANGE UP (ref 27–34)
MCHC RBC-ENTMCNC: 33 GM/DL — SIGNIFICANT CHANGE UP (ref 32–36)
MCV RBC AUTO: 98 FL — SIGNIFICANT CHANGE UP (ref 80–100)
MONOCYTES # BLD AUTO: 1.17 K/UL — HIGH (ref 0–0.9)
MONOCYTES NFR BLD AUTO: 10.4 % — SIGNIFICANT CHANGE UP (ref 2–14)
NEUTROPHILS # BLD AUTO: 8.62 K/UL — HIGH (ref 1.8–7.4)
NEUTROPHILS NFR BLD AUTO: 76.3 % — SIGNIFICANT CHANGE UP (ref 43–77)
NRBC # BLD AUTO: 0 K/UL — SIGNIFICANT CHANGE UP (ref 0–0)
NRBC # BLD: 0 /100 WBCS — SIGNIFICANT CHANGE UP (ref 0–0)
NRBC # FLD: 0 K/UL — SIGNIFICANT CHANGE UP (ref 0–0)
NRBC BLD-RTO: 0 /100 WBCS — SIGNIFICANT CHANGE UP (ref 0–0)
PHOSPHATE SERPL-MCNC: 4.4 MG/DL — SIGNIFICANT CHANGE UP (ref 2.5–4.5)
PLATELET # BLD AUTO: 276 K/UL — SIGNIFICANT CHANGE UP (ref 150–400)
POTASSIUM SERPL-MCNC: 6.8 MMOL/L — CRITICAL HIGH (ref 3.5–5.3)
POTASSIUM SERPL-SCNC: 6.8 MMOL/L — CRITICAL HIGH (ref 3.5–5.3)
PROT SERPL-MCNC: 8.3 G/DL — SIGNIFICANT CHANGE UP (ref 6–8.3)
RBC # BLD: 4.55 M/UL — SIGNIFICANT CHANGE UP (ref 3.8–5.2)
RBC # FLD: 13.4 % — SIGNIFICANT CHANGE UP (ref 10.3–14.5)
SODIUM SERPL-SCNC: 140 MMOL/L — SIGNIFICANT CHANGE UP (ref 135–145)
WBC # BLD: 11.29 K/UL — HIGH (ref 3.8–10.5)
WBC # FLD AUTO: 11.29 K/UL — HIGH (ref 3.8–10.5)

## 2024-09-18 PROCEDURE — 93010 ELECTROCARDIOGRAM REPORT: CPT

## 2024-09-18 PROCEDURE — 99284 EMERGENCY DEPT VISIT MOD MDM: CPT

## 2024-09-18 RX ORDER — CALCIUM GLUCONATE 20 MG/ML
2 INJECTION, SOLUTION INTRAVENOUS ONCE
Refills: 0 | Status: COMPLETED | OUTPATIENT
Start: 2024-09-18 | End: 2024-09-18

## 2024-09-18 RX ADMIN — Medication 1000 MILLILITER(S): at 22:12

## 2024-09-19 VITALS
HEART RATE: 97 BPM | OXYGEN SATURATION: 99 % | RESPIRATION RATE: 16 BRPM | TEMPERATURE: 98 F | DIASTOLIC BLOOD PRESSURE: 90 MMHG | SYSTOLIC BLOOD PRESSURE: 144 MMHG

## 2024-09-19 LAB
ANION GAP SERPL CALC-SCNC: 15 MMOL/L — HIGH (ref 7–14)
ANION GAP SERPL CALC-SCNC: 17 MMOL/L — HIGH (ref 7–14)
BUN SERPL-MCNC: 37 MG/DL — HIGH (ref 7–23)
BUN SERPL-MCNC: 38 MG/DL — HIGH (ref 7–23)
CALCIUM SERPL-MCNC: 8.1 MG/DL — LOW (ref 8.4–10.5)
CALCIUM SERPL-MCNC: 9.7 MG/DL — SIGNIFICANT CHANGE UP (ref 8.4–10.5)
CHLORIDE SERPL-SCNC: 112 MMOL/L — HIGH (ref 98–107)
CHLORIDE SERPL-SCNC: 115 MMOL/L — HIGH (ref 98–107)
CO2 SERPL-SCNC: 15 MMOL/L — LOW (ref 22–31)
CO2 SERPL-SCNC: 17 MMOL/L — LOW (ref 22–31)
CREAT SERPL-MCNC: 0.63 MG/DL — SIGNIFICANT CHANGE UP (ref 0.5–1.3)
CREAT SERPL-MCNC: 0.81 MG/DL — SIGNIFICANT CHANGE UP (ref 0.5–1.3)
EGFR: 77 ML/MIN/1.73M2 — SIGNIFICANT CHANGE UP
EGFR: 77 ML/MIN/1.73M2 — SIGNIFICANT CHANGE UP
EGFR: 94 ML/MIN/1.73M2 — SIGNIFICANT CHANGE UP
EGFR: 94 ML/MIN/1.73M2 — SIGNIFICANT CHANGE UP
GLUCOSE SERPL-MCNC: 102 MG/DL — HIGH (ref 70–99)
GLUCOSE SERPL-MCNC: 95 MG/DL — SIGNIFICANT CHANGE UP (ref 70–99)
POTASSIUM SERPL-MCNC: 5.6 MMOL/L — HIGH (ref 3.5–5.3)
POTASSIUM SERPL-MCNC: SIGNIFICANT CHANGE UP MMOL/L (ref 3.5–5.3)
POTASSIUM SERPL-SCNC: 5.6 MMOL/L — HIGH (ref 3.5–5.3)
POTASSIUM SERPL-SCNC: SIGNIFICANT CHANGE UP MMOL/L (ref 3.5–5.3)
SODIUM SERPL-SCNC: 145 MMOL/L — SIGNIFICANT CHANGE UP (ref 135–145)
SODIUM SERPL-SCNC: 146 MMOL/L — HIGH (ref 135–145)

## 2024-09-19 RX ORDER — CEFTRIAXONE 500 MG/1
1000 INJECTION, POWDER, FOR SOLUTION INTRAMUSCULAR; INTRAVENOUS ONCE
Refills: 0 | Status: COMPLETED | OUTPATIENT
Start: 2024-09-19 | End: 2024-09-19

## 2024-09-19 RX ADMIN — CALCIUM GLUCONATE 200 GRAM(S): 20 INJECTION, SOLUTION INTRAVENOUS at 00:03

## 2024-09-19 RX ADMIN — CEFTRIAXONE 100 MILLIGRAM(S): 500 INJECTION, POWDER, FOR SOLUTION INTRAMUSCULAR; INTRAVENOUS at 04:02
